# Patient Record
Sex: MALE | Race: WHITE | NOT HISPANIC OR LATINO | Employment: OTHER | URBAN - METROPOLITAN AREA
[De-identification: names, ages, dates, MRNs, and addresses within clinical notes are randomized per-mention and may not be internally consistent; named-entity substitution may affect disease eponyms.]

---

## 2018-07-28 ENCOUNTER — ANESTHESIA EVENT (OUTPATIENT)
Dept: GASTROENTEROLOGY | Facility: HOSPITAL | Age: 53
End: 2018-07-28
Payer: COMMERCIAL

## 2018-07-28 RX ORDER — SODIUM CHLORIDE 9 MG/ML
125 INJECTION, SOLUTION INTRAVENOUS CONTINUOUS
Status: CANCELLED | OUTPATIENT
Start: 2018-07-28

## 2018-07-30 RX ORDER — HYDROCODONE BITARTRATE AND ACETAMINOPHEN 5; 325 MG/1; MG/1
1 TABLET ORAL EVERY 6 HOURS PRN
COMMUNITY
End: 2018-10-25

## 2018-07-30 RX ORDER — TACROLIMUS 1 MG/1
1 CAPSULE ORAL EVERY 12 HOURS SCHEDULED
COMMUNITY

## 2018-07-30 RX ORDER — ACYCLOVIR 400 MG/1
400 TABLET ORAL 3 TIMES DAILY
COMMUNITY

## 2018-07-31 ENCOUNTER — HOSPITAL ENCOUNTER (OUTPATIENT)
Facility: HOSPITAL | Age: 53
Setting detail: OUTPATIENT SURGERY
Discharge: HOME | End: 2018-07-31
Attending: INTERNAL MEDICINE | Admitting: INTERNAL MEDICINE
Payer: COMMERCIAL

## 2018-07-31 ENCOUNTER — ANESTHESIA (OUTPATIENT)
Dept: GASTROENTEROLOGY | Facility: HOSPITAL | Age: 53
End: 2018-07-31
Payer: COMMERCIAL

## 2018-07-31 VITALS
BODY MASS INDEX: 25.77 KG/M2 | TEMPERATURE: 98.2 F | HEIGHT: 70 IN | OXYGEN SATURATION: 96 % | RESPIRATION RATE: 18 BRPM | DIASTOLIC BLOOD PRESSURE: 82 MMHG | HEART RATE: 60 BPM | SYSTOLIC BLOOD PRESSURE: 132 MMHG | WEIGHT: 180 LBS

## 2018-07-31 DIAGNOSIS — R19.7 DIARRHEA: ICD-10-CM

## 2018-07-31 DIAGNOSIS — E66.3 OVERWEIGHT: ICD-10-CM

## 2018-07-31 DIAGNOSIS — R10.13 EPIGASTRIC PAIN: ICD-10-CM

## 2018-07-31 PROCEDURE — 88305 TISSUE EXAM BY PATHOLOGIST: CPT | Performed by: PATHOLOGY

## 2018-07-31 PROCEDURE — 88342 IMHCHEM/IMCYTCHM 1ST ANTB: CPT | Performed by: PATHOLOGY

## 2018-07-31 RX ORDER — SODIUM CHLORIDE 9 MG/ML
125 INJECTION, SOLUTION INTRAVENOUS CONTINUOUS
Status: DISCONTINUED | OUTPATIENT
Start: 2018-07-31 | End: 2018-07-31 | Stop reason: HOSPADM

## 2018-07-31 RX ORDER — PROPOFOL 10 MG/ML
INJECTION, EMULSION INTRAVENOUS AS NEEDED
Status: DISCONTINUED | OUTPATIENT
Start: 2018-07-31 | End: 2018-07-31 | Stop reason: SURG

## 2018-07-31 RX ADMIN — LIDOCAINE HYDROCHLORIDE 20 MG: 20 INJECTION, SOLUTION INTRAVENOUS at 13:08

## 2018-07-31 RX ADMIN — PROPOFOL 20 MG: 10 INJECTION, EMULSION INTRAVENOUS at 13:11

## 2018-07-31 RX ADMIN — SODIUM CHLORIDE 125 ML/HR: 0.9 INJECTION, SOLUTION INTRAVENOUS at 12:45

## 2018-07-31 RX ADMIN — PROPOFOL 200 MG: 10 INJECTION, EMULSION INTRAVENOUS at 13:08

## 2018-07-31 NOTE — ANESTHESIA PREPROCEDURE EVALUATION
Review of Systems/Medical History  Patient summary reviewed  Chart reviewed      Cardiovascular  Negative cardio ROS    Pulmonary  Negative pulmonary ROS        GI/Hepatic  Negative GI/hepatic ROS          Negative  ROS        Endo/Other  Negative endo/other ROS      GYN  Negative gynecology ROS          Hematology      Comment: AML in remission--S/P stem cell and bone marrow transplant Musculoskeletal  Negative musculoskeletal ROS        Neurology  Negative neurology ROS      Psychology   Negative psychology ROS              Physical Exam    Airway    Mallampati score: II  TM Distance: >3 FB  Neck ROM: full     Dental   Comment: Multiple caps,     Cardiovascular  Comment: Negative ROS, Rhythm: regular, Rate: normal, Cardiovascular exam normal    Pulmonary  Pulmonary exam normal Breath sounds clear to auscultation,     Other Findings        Anesthesia Plan  ASA Score- 2     Anesthesia Type- IV sedation with anesthesia with ASA Monitors  Additional Monitors:   Airway Plan:         Plan Factors-Patient not instructed to abstain from smoking on day of procedure  Patient did not smoke on day of surgery  Induction- intravenous  Postoperative Plan-     Informed Consent- Anesthetic plan and risks discussed with patient and spouse

## 2018-07-31 NOTE — DISCHARGE INSTRUCTIONS
Please call [phone] with any problems  I have directed my office to call you with prescription for omeprazole 20 mg p o  b i d   If you have any problems with medication please stop and call us  Please follow up in the office as previously arranged  Esophagitis   WHAT YOU NEED TO KNOW:   What is esophagitis? Esophagitis is inflammation or irritation of the lining of the esophagus  What causes esophagitis? The most common cause is acid reflux  This means stomach acid backs up into your esophagus  The following can also cause esophagitis:  · An infection from bacteria, a virus, or a fungus    · Vomiting or a hiatal hernia    · Medicines such as aspirin or NSAIDs    · Large pills taken without enough water or right before you go to bed    · Cancer treatment, such as radiation    · A toxic object you swallowed, such as a button battery, that gets stuck in your esophagus    · Too much caffeine or acidic or spicy foods    · Cigarette smoking  What are the signs and symptoms of esophagitis? Signs and symptoms depend on the cause of your esophagitis  You may have any of the following:  · Pain in the middle of your chest that may spread to your back    · Burning or pain in your esophagus, abdominal pain, or indigestion    · Trouble swallowing, or pain when you swallow    · A feeling that something is stuck in your esophagus    · Sore throat, a cough, or hoarseness    · Gagging, drooling, or wheezing    · Mouth sores (white patches), or a bad taste in your mouth or bad breath    · Nausea or vomiting    · Feeding problems or failure to thrive (young children)  How is esophagitis diagnosed? Your healthcare provider will ask about your symptoms and when they started  Tell him if anything makes your symptoms worse or better  You may need any of the following:  · An endoscopy  is a procedure used to look at your esophagus and stomach   Your healthcare provider will use an endoscope (tube with a camera and light on the end)  He may also take a tissue sample during the procedure  The sample may show if your esophagus was damaged by what is causing your esophagitis  · A barium swallow  is done to show if your esophagus was damaged and how badly it was damaged  X-rays are taken after you swallow barium liquid  Barium liquid is used to help damage show up on the x-ray  How is esophagitis treated? The goal of treatment is to help the lining of your esophagus heal and to prevent serious complications  Treatment will depend on what is causing your esophagitis  Symptoms caused by a toxic object such as a button battery need immediate treatment  Less severe causes may not need treatment  You may need any of the following if symptoms continue or get worse:  · Medicines  may be given to fight infection or to control stomach acid  Your healthcare provider may make changes to your medicines, such as changing it to a liquid form  · An elimination diet  may help you find foods that are causing your symptoms  You will stop eating certain foods that can cause esophagitis  Your healthcare provider will tell you to start eating them again one at a time  Each time you do not have symptoms, you will start eating another food from the list  Any food that does cause symptoms may be causing your esophagitis  · Surgery  may be needed if other treatments do not work  Part of your stomach can be wrapped to cover the valve between your stomach and esophagus  This helps prevent acid from backing up into your esophagus  What can I do to manage or prevent esophagitis? · Do not smoke  Nicotine and other chemicals in cigarettes and cigars can cause blood vessel and lung damage  Ask your healthcare provider for information if you currently smoke and need help to quit  E-cigarettes or smokeless tobacco still contain nicotine  Talk to your healthcare provider before you use these products  · Do not drink alcohol    Alcohol can irritate your esophagus  Talk to your healthcare provider if you need help to stop drinking  · Limit or do not eat foods that can lead to esophagitis  Foods such as oranges and salsa can irritate your esophagus  Caffeine and chocolate can cause acid reflux  High-fat and fried foods make your stomach digest food more slowly  This increases the amount of stomach acid your esophagus is exposed to  Eat small meals, and drink water with your meals  Soft foods such as yogurt and applesauce may help soothe your throat  Do not eat for at least 3 hours before you go to bed  · Keep batteries and similar objects out of the reach of children  Babies often put items in their mouths to explore them  Button batteries are easy to swallow and can cause serious damage  Keep the battery covers of electronic devices such as remote controls taped closed  Store all batteries and toxic materials where children cannot get to them  Use childproof locks to keep children away from dangerous materials  · Drink more liquid when you take pills  Drink a full glass of water when you take your pills  Ask your healthcare provider if you can take your pills at least an hour before you go to bed  · Prevent acid reflux  Do not bend over unless it is necessary  Acid may back up into your esophagus when you bend over  If possible, keep the head of your bed elevated while you sleep  This will help keep acid from backing up  Manage stress  Stress can make your symptoms worse or cause stomach acid to back up  Call 911 for any of the following:   · You have chest pain that does not go away within a few minutes or gets worse  When should I seek immediate care? · You feel like you have food stuck in your throat and you cannot cough it out  When should I contact my healthcare provider? · You have new or worsening symptoms, even after treatment  · You have questions or concerns about your condition or care    CARE AGREEMENT:   You have the right to help plan your care  Learn about your health condition and how it may be treated  Discuss treatment options with your caregivers to decide what care you want to receive  You always have the right to refuse treatment  The above information is an  only  It is not intended as medical advice for individual conditions or treatments  Talk to your doctor, nurse or pharmacist before following any medical regimen to see if it is safe and effective for you  © 2017 [employer] Information is for End User's use only and may not be sold, redistributed or otherwise used for commercial purposes  All illustrations and images included in CareNotes® are the copyrighted property of A D A M [employer] Inc  or [employer]  Upper Endoscopy   WHAT YOU NEED TO KNOW:   An upper endoscopy is also called an upper gastrointestinal (GI) endoscopy, or an esophagogastroduodenoscopy (EGD)  You may feel bloated, gassy, or have some abdominal discomfort after your procedure  Your throat may be sore for 24 to 36 hours  You may burp or pass gas from air that is still inside your body  DISCHARGE INSTRUCTIONS:   Call 911 if:   · You have sudden chest pain or trouble breathing  Seek care immediately if:   · You feel dizzy or faint  · You have trouble swallowing  · You have severe throat pain  · Your bowel movements are very dark or black  · Your abdomen is hard and firm and you have severe pain  · You vomit blood  Contact your healthcare provider if:   · You feel full or bloated and cannot burp or pass gas  · You have not had a bowel movement for 3 days after your procedure  · You have neck pain  · You have a fever or chills  · You have nausea or are vomiting  · You have a rash or hives  · You have questions or concerns about your endoscopy  Relieve a sore throat:  Suck on throat lozenges or crushed ice  Gargle with a small amount of warm salt water   Mix 1 teaspoon of salt and 1 cup of warm water to make salt water  Relieve gas and discomfort from bloating:  Lie on your right side with a heating pad on your abdomen  Take short walks to help pass gas  Eat small meals until bloating is relieved  Rest after your procedure:  Do not drive or make important decisions until the day after your procedure  Return to your normal activity as directed  You can usually return to work the day after your procedure  Follow up with your healthcare provider as directed:  Write down your questions so you remember to ask them during your visits  © 2017 [facility] [facility] Information is for End User's use only and may not be sold, redistributed or otherwise used for commercial purposes  All illustrations and images included in CareNotes® are the copyrighted property of [employer]  or Arjun Ole[employer]  The above information is an  only  It is not intended as medical advice for individual conditions or treatments  Talk to your doctor, nurse or pharmacist before following any medical regimen to see if it is safe and effective for you

## 2018-08-27 ENCOUNTER — HOSPITAL ENCOUNTER (EMERGENCY)
Facility: HOSPITAL | Age: 53
Discharge: HOME | End: 2018-08-28
Attending: EMERGENCY MEDICINE
Payer: COMMERCIAL

## 2018-08-27 VITALS
WEIGHT: 175 LBS | HEIGHT: 70 IN | HEART RATE: 82 BPM | DIASTOLIC BLOOD PRESSURE: 83 MMHG | OXYGEN SATURATION: 97 % | RESPIRATION RATE: 18 BRPM | TEMPERATURE: 97.7 F | BODY MASS INDEX: 25.05 KG/M2 | SYSTOLIC BLOOD PRESSURE: 132 MMHG

## 2018-08-27 DIAGNOSIS — T63.441A BEE STING: Primary | ICD-10-CM

## 2018-08-28 LAB
ANION GAP SERPL CALCULATED.3IONS-SCNC: 7 MMOL/L (ref 4–13)
BASOPHILS # BLD AUTO: 0.1 THOUSANDS/ΜL (ref 0–0.1)
BASOPHILS NFR BLD AUTO: 1 % (ref 0–2)
BUN SERPL-MCNC: 30 MG/DL (ref 7–25)
CALCIUM SERPL-MCNC: 9.4 MG/DL (ref 8.6–10.5)
CHLORIDE SERPL-SCNC: 105 MMOL/L (ref 98–107)
CO2 SERPL-SCNC: 26 MMOL/L (ref 21–31)
CREAT SERPL-MCNC: 1.21 MG/DL (ref 0.7–1.3)
EOSINOPHIL # BLD AUTO: 0.4 THOUSAND/ΜL (ref 0–0.61)
EOSINOPHIL NFR BLD AUTO: 2 % (ref 0–5)
ERYTHROCYTE [DISTWIDTH] IN BLOOD BY AUTOMATED COUNT: 12.5 % (ref 11.5–14.5)
GFR SERPL CREATININE-BSD FRML MDRD: 69 ML/MIN/1.73SQ M
GLUCOSE SERPL-MCNC: 93 MG/DL (ref 65–99)
HCT VFR BLD AUTO: 39.3 % (ref 36.5–49.3)
HGB BLD-MCNC: 13.1 G/DL (ref 14–18)
LYMPHOCYTES # BLD AUTO: 3.2 THOUSANDS/ΜL (ref 0.6–4.47)
LYMPHOCYTES NFR BLD AUTO: 20 % (ref 21–51)
MCH RBC QN AUTO: 31.8 PG (ref 26–34)
MCHC RBC AUTO-ENTMCNC: 33.2 G/DL (ref 31–37)
MCV RBC AUTO: 96 FL (ref 81–99)
MONOCYTES # BLD AUTO: 1.7 THOUSAND/ΜL (ref 0.17–1.22)
MONOCYTES NFR BLD AUTO: 11 % (ref 2–12)
NEUTROPHILS # BLD AUTO: 10.9 THOUSANDS/ΜL (ref 1.4–6.5)
NEUTS SEG NFR BLD AUTO: 67 % (ref 42–75)
NRBC BLD AUTO-RTO: 0 /100 WBCS
PLATELET # BLD AUTO: 344 THOUSANDS/UL (ref 149–390)
PMV BLD AUTO: 8.5 FL (ref 8.6–11.7)
POTASSIUM SERPL-SCNC: 3.8 MMOL/L (ref 3.5–5.5)
RBC # BLD AUTO: 4.11 MILLION/UL (ref 4.3–5.9)
SODIUM SERPL-SCNC: 138 MMOL/L (ref 134–143)
WBC # BLD AUTO: 16.3 THOUSAND/UL (ref 4.8–10.8)

## 2018-08-28 PROCEDURE — 96365 THER/PROPH/DIAG IV INF INIT: CPT

## 2018-08-28 PROCEDURE — 80048 BASIC METABOLIC PNL TOTAL CA: CPT | Performed by: EMERGENCY MEDICINE

## 2018-08-28 PROCEDURE — 36415 COLL VENOUS BLD VENIPUNCTURE: CPT | Performed by: EMERGENCY MEDICINE

## 2018-08-28 PROCEDURE — 87040 BLOOD CULTURE FOR BACTERIA: CPT | Performed by: EMERGENCY MEDICINE

## 2018-08-28 PROCEDURE — 85025 COMPLETE CBC W/AUTO DIFF WBC: CPT | Performed by: EMERGENCY MEDICINE

## 2018-08-28 PROCEDURE — 99283 EMERGENCY DEPT VISIT LOW MDM: CPT

## 2018-08-28 RX ORDER — PREDNISONE 20 MG/1
40 TABLET ORAL ONCE
Status: COMPLETED | OUTPATIENT
Start: 2018-08-28 | End: 2018-08-28

## 2018-08-28 RX ORDER — HYDROXYZINE HYDROCHLORIDE 25 MG/1
50 TABLET, FILM COATED ORAL ONCE
Status: COMPLETED | OUTPATIENT
Start: 2018-08-28 | End: 2018-08-28

## 2018-08-28 RX ORDER — PREDNISONE 20 MG/1
40 TABLET ORAL DAILY
Qty: 15 TABLET | Refills: 0 | Status: SHIPPED | OUTPATIENT
Start: 2018-08-28 | End: 2018-09-02

## 2018-08-28 RX ORDER — CLINDAMYCIN HYDROCHLORIDE 150 MG/1
150 CAPSULE ORAL EVERY 6 HOURS
Qty: 12 CAPSULE | Refills: 0 | Status: SHIPPED | OUTPATIENT
Start: 2018-08-28 | End: 2018-08-31

## 2018-08-28 RX ORDER — HYDROXYZINE HYDROCHLORIDE 25 MG/1
25 TABLET, FILM COATED ORAL EVERY 6 HOURS
Qty: 12 TABLET | Refills: 0 | Status: SHIPPED | OUTPATIENT
Start: 2018-08-28 | End: 2018-10-25

## 2018-08-28 RX ORDER — CLINDAMYCIN PHOSPHATE 600 MG/50ML
600 INJECTION INTRAVENOUS ONCE
Status: COMPLETED | OUTPATIENT
Start: 2018-08-28 | End: 2018-08-28

## 2018-08-28 RX ADMIN — PREDNISONE 40 MG: 20 TABLET ORAL at 00:30

## 2018-08-28 RX ADMIN — HYDROXYZINE HYDROCHLORIDE 50 MG: 25 TABLET ORAL at 00:30

## 2018-08-28 RX ADMIN — CLINDAMYCIN IN 5 PERCENT DEXTROSE 600 MG: 12 INJECTION, SOLUTION INTRAVENOUS at 01:47

## 2018-08-28 NOTE — DISCHARGE INSTRUCTIONS
Insect Bite or Sting   WHAT YOU NEED TO KNOW:   Most insect bites and stings are not dangerous and go away without treatment  Your symptoms may be mild, or you may develop anaphylaxis  Anaphylaxis is a sudden, life-threatening reaction that needs immediate treatment  Common examples of insects that bite or sting are bees, ticks, mosquitoes, spiders, and ants  Insect bites or stings can lead to diseases such as malaria, West Nile virus, Lyme disease, or [place] Spotted Fever  DISCHARGE INSTRUCTIONS:   Call 911 for signs or symptoms of anaphylaxis,  such as trouble breathing, swelling in your mouth or throat, or wheezing  You may also have itching, a rash, hives, or feel like you are going to faint  Return to the emergency department if:   · You are stung on your tongue or in your throat  · A white area forms around the bite  · You are sweating badly or have body pain  · You think you were bitten or stung by a poisonous insect  Contact your healthcare provider if:   · You have a fever  · The area becomes red, warm, tender, and swollen beyond the area of the bite or sting  · You have questions or concerns about your condition or care  Medicines:   · Antihistamines  decrease itching and rash  · Epinephrine  is used to treat severe allergic reactions such as anaphylaxis  · Take your medicine as directed  Contact your healthcare provider if you think your medicine is not helping or if you have side effects  Tell him of her if you are allergic to any medicine  Keep a list of the medicines, vitamins, and herbs you take  Include the amounts, and when and why you take them  Bring the list or the pill bottles to follow-up visits  Carry your medicine list with you in case of an emergency  Steps to take for signs or symptoms of anaphylaxis:   · Immediately  give 1 shot of epinephrine only into the outer thigh muscle  · Leave the shot in place  as directed   Your healthcare provider may recommend you leave it in place for up to 10 seconds before you remove it  This helps make sure all of the epinephrine is delivered  · Call 911 and go to the emergency department,  even if the shot improved symptoms  Do not drive yourself  Bring the used epinephrine shot with you  Safety precautions to take if you are at risk for anaphylaxis:   · Keep 2 shots of epinephrine with you at all times  You may need a second shot, because epinephrine only works for about 20 minutes and symptoms may return  Your healthcare provider can show you and family members how to give the shot  Check the expiration date every month and replace it before it expires  · Create an action plan  Your healthcare provider can help you create a written plan that explains the allergy and an emergency plan to treat a reaction  The plan explains when to give a second epinephrine shot if symptoms return or do not improve after the first  Give copies of the action plan and emergency instructions to family members, work and school staff, and  providers  Show them how to give a shot of epinephrine  · Carry medical alert identification  Wear medical alert jewelry or carry a card that says you have an insect allergy  Ask your healthcare provider where to get these items  If an insect bites or stings you:   · Remove the stinger  Scrape the stinger out with your fingernail, edge of a credit card, or a knife blade  Do not squeeze the wound  Gently wash the area with soap and water  · Remove the tick  Ticks must be removed as soon as possible so you do not get diseases passed through tick bites  Ask your healthcare provider for more information on tick bites and how to remove ticks  Care for a bite or sting wound:   · Elevate the affected area  Prop the wound above the level of your heart, if possible  Elevate the area for 10 to 20 minutes each hour or as directed by your healthcare provider  · Use compresses    Soak a clean washcloth in cold water, wring it out, and put it on the bite or sting  Use the compress for 10 to 20 minutes each hour or as directed by your healthcare provider  After 24 to 48 hours, change to warm compresses  · Apply a paste  Add water to baking soda to make a thick paste  Put the paste on the area for 5 minutes  Rinse gently to remove the paste  Prevent another insect bite or sting:   · Do not wear bright-colored or flower-print clothing when you plan to spend time outdoors  Do not use hairspray, perfumes, or aftershave  · Do not leave food out  · Empty any standing water and wash container with soap and water every 2 days  · Put screens on all open windows and doors  · Put insect repellent that contains DEET on skin that is showing when you go outside  Put insect repellent at the top of your boots, bottom of pant legs, and sleeve cuffs  Wear long sleeves, pants, and shoes  · Use citronella candles outdoors to help keep mosquitoes away  Put a tick and flea collar on pets  Follow up with your healthcare provider as directed:  Write down your questions so you remember to ask them during your visits  © 2017 [facility] Information is for End User's use only and may not be sold, redistributed or otherwise used for commercial purposes  All illustrations and images included in CareNotes® are the copyrighted property of [employer]  or Arjun Oleary  The above information is an  only  It is not intended as medical advice for individual conditions or treatments  Talk to your doctor, nurse or pharmacist before following any medical regimen to see if it is safe and effective for you

## 2018-08-28 NOTE — ED PROVIDER NOTES
History  Chief Complaint   Patient presents with    Bee Sting     Pt stung yesterday, now skin tight, red, warm  Pt used ice, calomine, advil, benadryl yesterday  [age] YEAR OLD MALE WITH HISTORY IMMUNOCOMPROMISE SECONDARY TO CANCER TREATMENT PRESENTS NOW WITH A BEE STING TO THE RIGHT ANKLE OCCURRED 24 HOURS AGO  ON PATIENT'S EXPERIENCE PAIN AND ITCHING ALONG WITH EXPANDING REDNESS IN THE AREA OF THE BITE  DENIES ANY SHORTNESS OF BREATH OR DIFFICULTY SWALLOWING  EXPRESSES CONCERNS BECAUSE OF HIS IMMUNOSUPPRESSION AS IT RELATES TO A POSSIBLE INFECTION IN THE AREA  Prior to Admission Medications   Prescriptions Last Dose Informant Patient Reported? Taking? Cholecalciferol (VITAMIN D PO)   Yes Yes   Sig: Take by mouth daily   HYDROcodone-acetaminophen (NORCO) 5-325 mg per tablet   Yes Yes   Sig: Take 1 tablet by mouth every 6 (six) hours as needed for pain   Ibuprofen (ADVIL PO)   Yes Yes   Sig: Take by mouth as needed   MAGNESIUM PO   Yes Yes   Sig: Take by mouth daily   acyclovir (ZOVIRAX) 400 MG tablet   Yes Yes   Sig: Take 400 mg by mouth 3 (three) times a day   tacrolimus (PROGRAF) 1 mg capsule   Yes Yes   Sig: Take 1 mg by mouth every 12 (twelve) hours      Facility-Administered Medications: None       Past Medical History:   Diagnosis Date    Cancer ([facility] )     AML- [facility] marow [facility] Clostridium difficile colitis 2009    [facility]       Past Surgical History:   Procedure Laterality Date    COLONOSCOPY  2015    HERNIA REPAIR Bilateral 2003    inguinal    VT ESOPHAGOGASTRODUODENOSCOPY TRANSORAL DIAGNOSTIC N/A 7/31/2018    Procedure: ESOPHAGOGASTRODUODENOSCOPY (EGD) with bx;  Surgeon: Monique Reyes MD;  Location: [facility]; Service: Gastroenterology       History reviewed  No pertinent family history  I have reviewed and agree with the history as documented      Social History   Substance Use Topics    Smoking status: Never Smoker    Smokeless tobacco: Never Used    Alcohol use Yes      Comment: 1-2 drinks per week        Review of Systems   Constitutional: Negative for chills and fever  HENT: Negative for ear pain, rhinorrhea and sore throat  Eyes: Negative for pain, redness and visual disturbance  Respiratory: Negative for cough and shortness of breath  Cardiovascular: Negative for chest pain and leg swelling  Gastrointestinal: Negative for abdominal pain, diarrhea, nausea and vomiting  Genitourinary: Negative for dysuria, flank pain, frequency and urgency  Musculoskeletal: Negative for back pain, myalgias and neck pain  Skin: Negative for rash  Neurological: Negative for dizziness, weakness, light-headedness and headaches  Hematological: Negative  Psychiatric/Behavioral: Negative for agitation, confusion and suicidal ideas  The patient is not nervous/anxious  All other systems reviewed and are negative  Physical Exam  Physical Exam   Constitutional: He is oriented to person, place, and time  He appears well-developed and well-nourished  HENT:   Nose: Nose normal    Mouth/Throat: Oropharynx is clear and moist  No oropharyngeal exudate  Eyes: Conjunctivae and EOM are normal  Pupils are equal, round, and reactive to light  No scleral icterus  Neck: Normal range of motion  Neck supple  No JVD present  No tracheal deviation present  Cardiovascular: Normal rate, regular rhythm and normal heart sounds  No murmur heard  Pulmonary/Chest: Effort normal and breath sounds normal  No respiratory distress  He has no wheezes  He has no rales  Abdominal: Soft  Bowel sounds are normal  There is no tenderness  There is no guarding  Musculoskeletal: Normal range of motion  He exhibits no edema or tenderness  Neurological: He is alert and oriented to person, place, and time  No cranial nerve deficit or sensory deficit  He exhibits normal muscle tone  5/5 motor, nl sens   Skin: Skin is warm and dry  There is erythema  RIGHT LATERAL ANKLE IS SOMEWHAT ERYTHEMATOUS WITH A SOMEWHAT BRAWNY EDEMATOUS APPEARANCE THERE IS SOME EXTENSION OF THE ERYTHEMA PROXIMAL WERE TO THE DISTAL 3RD OF THE LOWER EXTREMITY ON THE RIGHT  THERE IS A CENTRAL AREA THAT IS MILDLY CRUSTED AND ELEVATED  THERE IS NO OOZE NOTED THERE IS NO SIGNIFICANT WARMTH MINIMAL TENDERNESS ASSOCIATED WITH THE AREA  FULL RANGE OF MOTION THE EXTREMITY  Psychiatric: He has a normal mood and affect  His behavior is normal    Nursing note and vitals reviewed  Vital Signs  ED Triage Vitals [08/27/18 2351]   Temperature Pulse Respirations Blood Pressure SpO2   97 7 °F (36 5 °C) 82 18 132/83 97 %      Temp Source Heart Rate Source Patient Position - Orthostatic VS BP Location FiO2 (%)   Temporal Monitor Lying Left arm --      Pain Score       7           Vitals:    08/27/18 2351   BP: 132/83   Pulse: 82   Patient Position - Orthostatic VS: Lying       Visual Acuity      ED Medications  Medications - No data to display    Diagnostic Studies  Results Reviewed     None                 No orders to display              Procedures  Procedures       Phone Contacts  ED Phone Contact    ED Course                               MDM  Number of Diagnoses or Management Options  Bee sting:   Diagnosis management comments: UPON DISCHARGE, THE PATIENT'S WIFE CONFRONTED THIS EDP QUESTIONING WHY AN IV DOSE OF ANTIBIOTICS AND LAB STUDIES WERE NOT COMPLETED  THE PATIENT'S WIFE STATES THAT THE AREA IS CLEARLY CELLULITIC AND THE PATIENT IS IMMUNOCOMPROMISED  THE EXAM FINDINGS DO NOT SUGGEST A CELLULITIC PROCESS, THAT IS THE AREA IS ERYTHEMATOUS WITHOUT SIGNIFICANT WARMTH, THERE IS A MINOR SEROUS OOZE, THERE IS NO SIGNIFICANT EDEMA, THERE IS MINIMAL TENDERNESS  NV/SENSORY IS INTACT   YET, THE PATIENT'S WIFE MAINTAINS THAT IT IS CLEARLY CELLULITIC  THE PATIENT IS OFFERED IV ANTIIBIOTICS, CBC, BMP, AND BLOOD CULTURES    HOWEVER, THE WIFE NOW RAISES THE QUESTION AS TO WHY SHE HAD TO INSIST ON ANTIBIOTICS, WHEN, IN FACT, THE PATIENT IS OFFERED ANTIBIOTICS AND A PRESCRIPTION FOR SAME IS WRITTEN  THE PATIENT'S WIFE NOW RAISES THE QUESTION OF WHAT IS BEING OFFERED TO AND FOR THE PATIENT SUGGESTING THAT "YOUR THE DOCTOR"  FOLLOWING THE HERETOFORE DISCUSSION, THE PATIENT'S WIFE NOW QUESTIONS THE NATURE OF THE STUDIES  THE PATIENT'S WIFE IS RELUCTANT TO STAY FOR BLOOD RESULTS, STATING THAT BLOOD CULTURES WOULD "GIVE US THE ANSWER"  THE PATIENT AND WIDFE WERE ADVISED THAT STAT CBC WOULD GIVE US INSIGHT INTO A POTENTIALLY INFECTIOUS PROCESS  THEY AGREED TO HAVE THE APPROPRIATE STUDIES DONE  AT1:50AM, THE CBC RETURNED WITH WBCs OF 16 3 WITHOUT A SHIFT, NEUTROPHILS 67  THE PATIENT REMAINS HEMODYNAMICALLY AND CLINICALLY STABLE WITHOUT EVIDENCE OF A SEPTIC PROCESS    CritCare Time    Disposition  Final diagnoses:   None     ED Disposition     None      Follow-up Information    None         Patient's Medications   Discharge Prescriptions    No medications on file     No discharge procedures on file      ED Provider  Electronically Signed by           Boo Abreu MD  08/28/18 5959       Boo Abreu MD  08/28/18 0110       Boo Abreu MD  08/28/18 8998

## 2018-09-02 LAB
BACTERIA BLD CULT: NORMAL
BACTERIA BLD CULT: NORMAL

## 2018-10-25 ENCOUNTER — APPOINTMENT (EMERGENCY)
Dept: CT IMAGING | Facility: HOSPITAL | Age: 53
End: 2018-10-25
Payer: COMMERCIAL

## 2018-10-25 ENCOUNTER — HOSPITAL ENCOUNTER (EMERGENCY)
Facility: HOSPITAL | Age: 53
Discharge: HOME | End: 2018-10-25
Attending: EMERGENCY MEDICINE | Admitting: EMERGENCY MEDICINE
Payer: COMMERCIAL

## 2018-10-25 VITALS
TEMPERATURE: 98.5 F | OXYGEN SATURATION: 98 % | RESPIRATION RATE: 16 BRPM | BODY MASS INDEX: 25.05 KG/M2 | DIASTOLIC BLOOD PRESSURE: 74 MMHG | WEIGHT: 175 LBS | HEART RATE: 88 BPM | HEIGHT: 70 IN | SYSTOLIC BLOOD PRESSURE: 145 MMHG

## 2018-10-25 DIAGNOSIS — R03.0 ELEVATED BLOOD PRESSURE READING: ICD-10-CM

## 2018-10-25 DIAGNOSIS — N20.0 RENAL CALCULUS, BILATERAL: ICD-10-CM

## 2018-10-25 DIAGNOSIS — K76.89 LIVER CYST: ICD-10-CM

## 2018-10-25 DIAGNOSIS — R10.9 ABDOMINAL PAIN: Primary | ICD-10-CM

## 2018-10-25 DIAGNOSIS — I73.9 SMALL VESSEL DISEASE (HCC): ICD-10-CM

## 2018-10-25 DIAGNOSIS — R91.1 NODULE OF RIGHT LUNG: ICD-10-CM

## 2018-10-25 DIAGNOSIS — K57.90 DIVERTICULOSIS: ICD-10-CM

## 2018-10-25 LAB
ALBUMIN SERPL BCP-MCNC: 4.7 G/DL (ref 3.5–5.7)
ALP SERPL-CCNC: 60 U/L (ref 40–150)
ALT SERPL W P-5'-P-CCNC: 18 U/L (ref 7–52)
ANION GAP SERPL CALCULATED.3IONS-SCNC: 9 MMOL/L (ref 4–13)
APTT PPP: 47 SECONDS (ref 24–36)
AST SERPL W P-5'-P-CCNC: 21 U/L (ref 13–39)
BACTERIA UR QL AUTO: ABNORMAL /HPF
BASOPHILS # BLD AUTO: 0.1 THOUSANDS/ΜL (ref 0–0.1)
BASOPHILS NFR BLD AUTO: 1 % (ref 0–2)
BILIRUB SERPL-MCNC: 0.5 MG/DL (ref 0.2–1)
BILIRUB UR QL STRIP: NEGATIVE
BUN SERPL-MCNC: 34 MG/DL (ref 7–25)
CALCIUM SERPL-MCNC: 10.2 MG/DL (ref 8.6–10.5)
CHLORIDE SERPL-SCNC: 102 MMOL/L (ref 98–107)
CLARITY UR: CLEAR
CO2 SERPL-SCNC: 25 MMOL/L (ref 21–31)
COLOR UR: YELLOW
CREAT SERPL-MCNC: 1.17 MG/DL (ref 0.7–1.3)
EOSINOPHIL # BLD AUTO: 0 THOUSAND/ΜL (ref 0–0.61)
EOSINOPHIL NFR BLD AUTO: 0 % (ref 0–5)
ERYTHROCYTE [DISTWIDTH] IN BLOOD BY AUTOMATED COUNT: 12.3 % (ref 11.5–14.5)
GFR SERPL CREATININE-BSD FRML MDRD: 71 ML/MIN/1.73SQ M
GLUCOSE SERPL-MCNC: 132 MG/DL (ref 65–99)
GLUCOSE UR STRIP-MCNC: NEGATIVE MG/DL
HCT VFR BLD AUTO: 42.5 % (ref 36.5–49.3)
HGB BLD-MCNC: 14.2 G/DL (ref 14–18)
HGB UR QL STRIP.AUTO: ABNORMAL
INR PPP: 1.04 (ref 0.9–1.5)
KETONES UR STRIP-MCNC: ABNORMAL MG/DL
LACTATE SERPL-SCNC: 0.8 MMOL/L (ref 0.5–2)
LEUKOCYTE ESTERASE UR QL STRIP: NEGATIVE
LYMPHOCYTES # BLD AUTO: 2 THOUSANDS/ΜL (ref 0.6–4.47)
LYMPHOCYTES NFR BLD AUTO: 15 % (ref 21–51)
MCH RBC QN AUTO: 31.9 PG (ref 26–34)
MCHC RBC AUTO-ENTMCNC: 33.4 G/DL (ref 31–37)
MCV RBC AUTO: 95 FL (ref 81–99)
MONOCYTES # BLD AUTO: 0.8 THOUSAND/ΜL (ref 0.17–1.22)
MONOCYTES NFR BLD AUTO: 6 % (ref 2–12)
NEUTROPHILS # BLD AUTO: 10.6 THOUSANDS/ΜL (ref 1.4–6.5)
NEUTS SEG NFR BLD AUTO: 79 % (ref 42–75)
NITRITE UR QL STRIP: NEGATIVE
NON-SQ EPI CELLS URNS QL MICRO: ABNORMAL /HPF
NRBC BLD AUTO-RTO: 0 /100 WBCS
PH UR STRIP.AUTO: 5.5 [PH] (ref 5–8)
PLATELET # BLD AUTO: 358 THOUSANDS/UL (ref 149–390)
PMV BLD AUTO: 8.4 FL (ref 8.6–11.7)
POTASSIUM SERPL-SCNC: 4.2 MMOL/L (ref 3.5–5.5)
PROT SERPL-MCNC: 8.1 G/DL (ref 6.4–8.9)
PROT UR STRIP-MCNC: NEGATIVE MG/DL
PROTHROMBIN TIME: 12.1 SECONDS (ref 10.1–12.9)
RBC # BLD AUTO: 4.46 MILLION/UL (ref 4.3–5.9)
RBC #/AREA URNS AUTO: ABNORMAL /HPF
SODIUM SERPL-SCNC: 136 MMOL/L (ref 134–143)
SP GR UR STRIP.AUTO: 1.01 (ref 1–1.03)
UROBILINOGEN UR QL STRIP.AUTO: 0.2 E.U./DL
WBC # BLD AUTO: 13.4 THOUSAND/UL (ref 4.8–10.8)
WBC #/AREA URNS AUTO: ABNORMAL /HPF

## 2018-10-25 PROCEDURE — 85730 THROMBOPLASTIN TIME PARTIAL: CPT | Performed by: PHYSICIAN ASSISTANT

## 2018-10-25 PROCEDURE — 74177 CT ABD & PELVIS W/CONTRAST: CPT

## 2018-10-25 PROCEDURE — 36415 COLL VENOUS BLD VENIPUNCTURE: CPT | Performed by: PHYSICIAN ASSISTANT

## 2018-10-25 PROCEDURE — 99284 EMERGENCY DEPT VISIT MOD MDM: CPT

## 2018-10-25 PROCEDURE — 81003 URINALYSIS AUTO W/O SCOPE: CPT | Performed by: PHYSICIAN ASSISTANT

## 2018-10-25 PROCEDURE — 80053 COMPREHEN METABOLIC PANEL: CPT | Performed by: PHYSICIAN ASSISTANT

## 2018-10-25 PROCEDURE — 85610 PROTHROMBIN TIME: CPT | Performed by: PHYSICIAN ASSISTANT

## 2018-10-25 PROCEDURE — 96361 HYDRATE IV INFUSION ADD-ON: CPT

## 2018-10-25 PROCEDURE — 83605 ASSAY OF LACTIC ACID: CPT | Performed by: PHYSICIAN ASSISTANT

## 2018-10-25 PROCEDURE — 85025 COMPLETE CBC W/AUTO DIFF WBC: CPT | Performed by: PHYSICIAN ASSISTANT

## 2018-10-25 PROCEDURE — 70450 CT HEAD/BRAIN W/O DYE: CPT

## 2018-10-25 PROCEDURE — 81001 URINALYSIS AUTO W/SCOPE: CPT | Performed by: PHYSICIAN ASSISTANT

## 2018-10-25 PROCEDURE — 96374 THER/PROPH/DIAG INJ IV PUSH: CPT

## 2018-10-25 RX ORDER — CIPROFLOXACIN 500 MG/1
500 TABLET, FILM COATED ORAL EVERY 12 HOURS SCHEDULED
Qty: 14 TABLET | Refills: 0 | Status: SHIPPED | OUTPATIENT
Start: 2018-10-25 | End: 2018-11-01

## 2018-10-25 RX ORDER — CIPROFLOXACIN 500 MG/1
500 TABLET, FILM COATED ORAL ONCE
Status: COMPLETED | OUTPATIENT
Start: 2018-10-25 | End: 2018-10-25

## 2018-10-25 RX ORDER — ONDANSETRON 2 MG/ML
4 INJECTION INTRAMUSCULAR; INTRAVENOUS ONCE
Status: COMPLETED | OUTPATIENT
Start: 2018-10-25 | End: 2018-10-25

## 2018-10-25 RX ADMIN — IOHEXOL 80 ML: 350 INJECTION, SOLUTION INTRAVENOUS at 17:24

## 2018-10-25 RX ADMIN — SODIUM CHLORIDE 1000 ML: 0.9 INJECTION, SOLUTION INTRAVENOUS at 16:16

## 2018-10-25 RX ADMIN — CIPROFLOXACIN HYDROCHLORIDE 500 MG: 500 TABLET, FILM COATED ORAL at 18:38

## 2018-10-25 RX ADMIN — ONDANSETRON 4 MG: 2 INJECTION INTRAMUSCULAR; INTRAVENOUS at 16:16

## 2018-10-25 NOTE — DISCHARGE INSTRUCTIONS
Abdominal Pain, Ambulatory Care   GENERAL INFORMATION:   Abdominal pain  can be dull, achy, or sharp  You may have pain in one area of your abdomen, or in your entire abdomen  Your pain may be caused by constipation, food sensitivity or poisoning, infection, or a blockage  Abdominal pain can also be caused by a hernia, appendicitis, or an ulcer  The cause of your abdominal pain may be unknown  Seek immediate care for the following symptoms:   · New chest pain or shortness of breath    · Pulsing pain in your upper abdomen or lower back that suddenly becomes constant    · Pain in the right lower abdominal area that worsens with movement    · Fever over 100 4°F (38°C) or shaking chills    · Vomiting and you cannot keep food or fluids down    · Pain does not improve or gets worse over the next 8 to 12 hours    · Blood in your vomit or bowel movements, or they look black and tarry    · Skin or the whites of your eyes turn yellow    · Large amount of vaginal bleeding that is not your monthly period  Treatment for abdominal pain  may include medicine to calm your stomach, prevent vomiting, or decrease pain  Follow up with your healthcare provider as directed:  Write down your questions so you remember to ask them during your visits  CARE AGREEMENT:   You have the right to help plan your care  Learn about your health condition and how it may be treated  Discuss treatment options with your caregivers to decide what care you want to receive  You always have the right to refuse treatment  The above information is an  only  It is not intended as medical advice for individual conditions or treatments  Talk to your doctor, nurse or pharmacist before following any medical regimen to see if it is safe and effective for you  © 2014 [address] is for End User's use only and may not be sold, redistributed or otherwise used for commercial purposes   All illustrations and images included in [facility] are the copyrighted property of A D A M , Inc  or Arjun Oleary  Kidney Stones   WHAT YOU NEED TO KNOW:   Kidney stones form in the urinary system when the water and waste in your urine are out of balance  When this happens, certain types of waste crystals separate from the urine  The crystals build up and form kidney stones  You may have 1 or more kidney stones  DISCHARGE INSTRUCTIONS:   Return to the emergency department if:   · You have vomiting that is not relieved by medicine  Contact your healthcare provider if:   · You have a fever  · You have trouble passing urine  · You see blood in your urine  · You have severe pain  · You have any questions or concerns about your condition or care  Medicines:   · NSAIDs , such as ibuprofen, help decrease swelling, pain, and fever  This medicine is available with or without a doctor's order  NSAIDs can cause stomach bleeding or kidney problems in certain people  If you take blood thinner medicine, always ask your healthcare provider if NSAIDs are safe for you  Always read the medicine label and follow directions  · Prescription medicine  may be given  Ask how to take this medicine safely  · Medicines  to balance your electrolytes may be needed  · Take your medicine as directed  Contact your healthcare provider if you think your medicine is not helping or if you have side effects  Tell him or her if you are allergic to any medicine  Keep a list of the medicines, vitamins, and herbs you take  Include the amounts, and when and why you take them  Bring the list or the pill bottles to follow-up visits  Carry your medicine list with you in case of an emergency  Follow up with your healthcare provider as directed: You may need to return for more tests  Write down your questions so you remember to ask them during your visits  Self-care:   · Drink plenty of liquids    Your healthcare provider may tell you to drink at least 8 to 12 (eight-ounce) cups of liquids each day  This helps flush out the kidney stones when you urinate  Water is the best liquid to drink  · Strain your urine every time you go to the bathroom  Urinate through a strainer or a piece of thin cloth to catch the stones  Take the stones to your healthcare provider so they can be sent to the lab for tests  This will help your healthcare providers plan the best treatment for you  · Eat a variety of healthy foods  Healthy foods include fruits, vegetables, whole-grain breads, low-fat dairy products, beans, and fish  You may need to limit how much sodium (salt) or protein you eat  Ask for information about the best foods for you  · Stay active  Your stones may pass more easily by if you stay active  Ask about the best activities for you  After you pass your kidney stones:  Once you have passed your kidney stones, your healthcare provider may  order a 24-hour urine test  Results from a 24-hour urine test will help your healthcare provider plan ways to prevent more stones from forming  If you are told to do a 24-hour test, your healthcare provider will give you more instructions  © 2017 [address] [facility] Information is for End User's use only and may not be sold, redistributed or otherwise used for commercial purposes  All illustrations and images included in CareNotes® are the copyrighted property of [employer]  or Arjun Oleary  The above information is an  only  It is not intended as medical advice for individual conditions or treatments  Talk to your doctor, nurse or pharmacist before following any medical regimen to see if it is safe and effective for you

## 2018-10-25 NOTE — ED PROVIDER NOTES
History  Chief Complaint   Patient presents with    Abdominal Pain     began yesterday    Vomiting    Abscess     buttocks     Patient presents emergency room with complaint of headache light sensitivity and vomiting  He states this began yesterday  Complaints bilateral light sensitivity denies injury or foreign bodies both eyes  He states he has had a headache from his right nostril has been draining for 2 days  He states them yesterday he developed vomiting 4 times to the middle of the night and 1 time prior to arrival today  He is unable to keep solids and liquids down  He states he took 2 ibuprofen earlier which brought is headache down to a 4/10 from a 7/10  He then this morning developed some lower abdominal pain without complaints of diarrhea denies urinary frequency or urgency  Denies chest pain shortness of breath  Denies ear pain ringing in the ears sore throat fevers or chills at home  He denies any syncopal events complaints of confusion slurred speech  Wife is at bedside and agreed with patient's statements  Patient is concerned about having a history of lymphoma and is on anti-rejection drugs  History provided by:  Patient and spouse   used: No        Allergies   Allergen Reactions    Cephalosporins Rash    Sulfa Antibiotics Rash         Prior to Admission Medications   Prescriptions Last Dose Informant Patient Reported? Taking?    Cholecalciferol (VITAMIN D PO)   Yes No   Sig: Take by mouth daily   MAGNESIUM PO   Yes No   Sig: Take by mouth daily   acyclovir (ZOVIRAX) 400 MG tablet   Yes No   Sig: Take 400 mg by mouth 3 (three) times a day   tacrolimus (PROGRAF) 1 mg capsule   Yes No   Sig: Take 1 mg by mouth every 12 (twelve) hours      Facility-Administered Medications: None       Past Medical History:   Diagnosis Date    Cancer ([facility] [address] )     AML- bone marow transplant 2010 at [facility] Clostridium difficile colitis 2009    H/O bone marrow transplant [facility] 2010       Past Surgical History:   Procedure Laterality Date    COLONOSCOPY  2015    HERNIA REPAIR Bilateral 2003    inguinal    [facility] ESOPHAGOGASTRODUODENOSCOPY TRANSORAL DIAGNOSTIC N/A 7/31/2018    Procedure: ESOPHAGOGASTRODUODENOSCOPY (EGD) with bx;  Surgeon: Serina Mathur MD;  Location: [facility]; Service: Gastroenterology       History reviewed  No pertinent family history  I have reviewed and agree with the history as documented  Social History   Substance Use Topics    Smoking status: Never Smoker    Smokeless tobacco: Never Used    Alcohol use Yes      Comment: 1-2 drinks per week        Review of Systems   Constitutional: Positive for fatigue  Negative for chills, diaphoresis and fever  HENT: Positive for congestion, rhinorrhea (right side) and sinus pressure  Negative for ear pain, facial swelling, hearing loss, nosebleeds, sneezing, sore throat, tinnitus and trouble swallowing  Eyes: Positive for photophobia and pain  Negative for discharge and itching  Respiratory: Negative for cough, shortness of breath, wheezing and stridor  Cardiovascular: Negative for chest pain, palpitations and leg swelling  Gastrointestinal: Positive for abdominal pain (Bilateral lower abdomen), nausea and vomiting  Negative for abdominal distention, blood in stool, constipation and diarrhea  Genitourinary: Negative for difficulty urinating, dysuria, flank pain, frequency, hematuria and urgency  Musculoskeletal: Negative for gait problem, myalgias and neck pain  Skin: Negative for rash  Neurological: Positive for headaches  Negative for dizziness, syncope, facial asymmetry, speech difficulty, weakness, light-headedness and numbness  All other systems reviewed and are negative  Physical Exam  Physical Exam   Constitutional: He is oriented to person, place, and time  He appears well-developed and well-nourished  HENT:   Head: Normocephalic and atraumatic     Right Ear: External ear normal    Left Ear: External ear normal    Nose: Mucosal edema and rhinorrhea present  Mouth/Throat: Oropharyngeal exudate present  Eyes: Pupils are equal, round, and reactive to light  Conjunctivae and EOM are normal    Neck: Normal range of motion  Neck supple  No tracheal deviation present  Cardiovascular: Normal rate, regular rhythm, normal heart sounds and intact distal pulses  No murmur heard  Pulmonary/Chest: Effort normal and breath sounds normal  No respiratory distress  He has no wheezes  He has no rales  Abdominal: Soft  Bowel sounds are normal  He exhibits no distension and no mass  There is tenderness (b/l lower abdominal)  No hernia  Musculoskeletal: Normal range of motion  He exhibits no edema or tenderness  Neurological: He is alert and oriented to person, place, and time  No cranial nerve deficit or sensory deficit  He exhibits normal muscle tone  Coordination normal    Skin: Skin is warm and dry  No rash noted  No erythema  Nursing note and vitals reviewed        Vital Signs  ED Triage Vitals [10/25/18 1524]   Temperature Pulse Respirations Blood Pressure SpO2   98 °F (36 7 °C) 82 16 138/95 97 %      Temp Source Heart Rate Source Patient Position - Orthostatic VS BP Location FiO2 (%)   Temporal Monitor Sitting Left arm --      Pain Score       7           Vitals:    10/25/18 1524   BP: 138/95   Pulse: 82   Patient Position - Orthostatic VS: Sitting       Visual Acuity      ED Medications  Medications   sodium chloride 0 9 % bolus 1,000 mL (0 mL Intravenous Stopped 10/25/18 1733)   ondansetron (ZOFRAN) injection 4 mg (4 mg Intravenous Given 10/25/18 1616)   iohexol (OMNIPAQUE) 350 MG/ML injection (MULTI-DOSE) 80 mL (80 mL Intravenous Given 10/25/18 1724)   ciprofloxacin (CIPRO) tablet 500 mg (500 mg Oral Given 10/25/18 1838)       Diagnostic Studies  Results Reviewed     Procedure Component Value Units Date/Time    Urine Microscopic [[id number]]  (Abnormal) Collected:  10/25/18 1738 Lab Status:  Final result Specimen:  Urine from Urine, Clean Catch Updated:  10/25/18 1804     RBC, UA 2-4 (A) /hpf      WBC, UA None Seen /hpf      Epithelial Cells Occasional /hpf      Bacteria, UA Occasional /hpf     UA w Reflex to Microscopic w Reflex to Culture [[id number]]  (Abnormal) Collected:  10/25/18 1738    Lab Status:  Final result Specimen:  Urine from Urine, Clean Catch Updated:  10/25/18 1745     Color, UA Yellow     Clarity, UA Clear     Specific Gravity, UA 1 010     pH, UA 5 5     Leukocytes, UA Negative     Nitrite, UA Negative     Protein, UA Negative mg/dl      Glucose, UA Negative mg/dl      Ketones, UA 15 (1+) (A) mg/dl      Urobilinogen, UA 0 2 E U /dl      Bilirubin, UA Negative     Blood, UA 1+ (A)    Lactic acid, plasma [[id number]]  (Normal) Collected:  10/25/18 1606    Lab Status:  Final result Specimen:  Blood from Arm, Left Updated:  10/25/18 1641     LACTIC ACID 0 8 mmol/L     Narrative:         Result may be elevated if tourniquet was used during collection  Comprehensive metabolic panel [[id number]]  (Abnormal) Collected:  10/25/18 1606    Lab Status:  Final result Specimen:  Blood from Arm, Left Updated:  10/25/18 1641     Sodium 136 mmol/L      Potassium 4 2 mmol/L      Chloride 102 mmol/L      CO2 25 mmol/L      ANION GAP 9 mmol/L      BUN 34 (H) mg/dL      Creatinine 1 17 mg/dL      Glucose 132 (H) mg/dL      Calcium 10 2 mg/dL      AST 21 U/L      ALT 18 U/L      Alkaline Phosphatase 60 U/L      Total Protein 8 1 g/dL      Albumin 4 7 g/dL      Total Bilirubin 0 50 mg/dL      eGFR 71 ml/min/1 73sq m     Narrative:         National Kidney Disease Education Program recommendations are as follows:  GFR calculation is accurate only with a steady state creatinine  Chronic Kidney disease less than 60 ml/min/1 73 sq  meters  Kidney failure less than 15 ml/min/1 73 sq  meters      Kashif Sandoval [[id number]]  (Normal) Collected:  10/25/18 1606    Lab Status:  Final result Specimen:  Blood from Arm, Left Updated:  10/25/18 1633     Protime 12 1 seconds      INR 1 04    APTT [[id number]]  (Abnormal) Collected:  10/25/18 1606    Lab Status:  Final result Specimen:  Blood from Arm, Left Updated:  10/25/18 1633     PTT 47 (H) seconds     CBC and differential [[id number]]  (Abnormal) Collected:  10/25/18 1606    Lab Status:  Final result Specimen:  Blood from Arm, Left Updated:  10/25/18 1622     WBC 13 40 (H) Thousand/uL      RBC 4 46 Million/uL      Hemoglobin 14 2 g/dL      Hematocrit 42 5 %      MCV 95 fL      MCH 31 9 pg      MCHC 33 4 g/dL      RDW 12 3 %      MPV 8 4 (L) fL      Platelets 970 Thousands/uL      nRBC 0 /100 WBCs      Neutrophils Relative 79 (H) %      Lymphocytes Relative 15 (L) %      Monocytes Relative 6 %      Eosinophils Relative 0 %      Basophils Relative 1 %      Neutrophils Absolute 10 60 (H) Thousands/µL      Lymphocytes Absolute 2 00 Thousands/µL      Monocytes Absolute 0 80 Thousand/µL      Eosinophils Absolute 0 00 Thousand/µL      Basophils Absolute 0 10 Thousands/µL                  CT abdomen pelvis with contrast   Final Result by Mandie Duff (10/25 1818)   Small nonobstructing stone in the lower pole of each kidney   Colonic diverticulosis but no acute diverticulitis  Signed by SHERRI Batista  CT head without contrast   Final Result by Mandie Duff (10/25 1755)   No acute intracranial findings  If symptoms persist or if further imaging   is clinically indicated, consider follow-up CT or MRI  Low attenuation in the white matter bilaterally, age indeterminate  This   is most commonly from small vessel ischemic disease         Signed by SHERRI Batista  Procedures  Procedures       Phone Contacts  ED Phone Contact    ED Course  ED Course as of Oct 25 1903   Thu Oct 25, 2018   [id number] Discussed with patient result as well as incidental findings on CT scans    Recommended follow-up with PCP for pulmonary nodule re-evaluation as well as following of additional incidental is as noted in discharge paperwork  Advised patient of any acute changes worsening symptoms return to emergency room  Advised to follow up with his eye doctor he states he has not seen 1 in many years                                  MDM  Number of Diagnoses or Management Options     Amount and/or Complexity of Data Reviewed  Clinical lab tests: ordered and reviewed  Tests in the radiology section of CPT®: ordered and reviewed  Independent visualization of images, tracings, or specimens: yes    Risk of Complications, Morbidity, and/or Mortality  Presenting problems: moderate  Diagnostic procedures: moderate  Management options: moderate    Patient Progress  Patient progress: stable    CritCare Time    Disposition  Final diagnoses:   Abdominal pain   Renal calculus, bilateral nonobstructing   Liver cyst noted on CT   Nodule of right lung 3mm   Diverticulosis   Elevated blood pressure reading   Small vessel disease noted on Ct head     Time reflects when diagnosis was documented in both MDM as applicable and the Disposition within this note     Time User Action Codes Description Comment    10/25/2018  6:26 PM Charolett Lass Add [R10 9] Abdominal pain     10/25/2018  6:26 PM Charolett Lass Add [N20 0] Renal calculus, bilateral     10/25/2018  6:26 PM Charolett Lass Modify [N20 0] Renal calculus, bilateral nonobstructing     10/25/2018  6:27 PM Charolett Lass Add [K76 89] Liver cyst     10/25/2018  6:27 PM Tiffany POLANCO Modify [N08 96] Liver cyst noted on CT     10/25/2018  6:27 PM Tiffany POLANCO Add [R91 1] Nodule of right lung     10/25/2018  6:27 PM Charolett Lass Modify [R91 1] Nodule of right lung 3mm     10/25/2018  6:27 PM Charolett Lass Add [K57 90] Diverticulosis     10/25/2018  6:28 PM Charolett Lass Add [R03 0] Elevated blood pressure reading     10/25/2018  6:33 PM Charolett Lass Add [I99 9] Small vessel disease     10/25/2018  6:33 PM Tiffany POLANCO Modify [I99 9] Small vessel disease noted on Ct head       ED Disposition     ED Disposition Condition Comment    Discharge  Vicki Pleasant discharge to home/self care  Condition at discharge: Stable        Follow-up Information     Follow up With Specialties Details Why [address] [place], DO Nephrology Schedule an appointment as soon as possible for a visit in 3 days  [address]  [facility] [address]  [phone]      [facility] Schedule an appointment as soon as possible for a visit If symptoms worsen [address] [place] [id number] [phone]            Patient's Medications   Discharge Prescriptions    CIPROFLOXACIN (CIPRO) 500 MG TABLET    Take 1 tablet (500 mg total) by mouth every 12 (twelve) hours for 7 days       Start Date: 10/25/2018End Date: 11/1/2018       Order Dose: 500 mg       Quantity: 14 tablet    Refills: 0     No discharge procedures on file      ED Provider  Electronically Signed by           Cristiano Paris PA-C  10/25/18 1897

## 2019-02-25 ENCOUNTER — HOSPITAL ENCOUNTER (EMERGENCY)
Facility: HOSPITAL | Age: 54
Discharge: HOME | End: 2019-02-25
Attending: EMERGENCY MEDICINE | Admitting: EMERGENCY MEDICINE
Payer: COMMERCIAL

## 2019-02-25 ENCOUNTER — APPOINTMENT (EMERGENCY)
Dept: CT IMAGING | Facility: HOSPITAL | Age: 54
End: 2019-02-25
Payer: COMMERCIAL

## 2019-02-25 VITALS
BODY MASS INDEX: 26.48 KG/M2 | SYSTOLIC BLOOD PRESSURE: 131 MMHG | DIASTOLIC BLOOD PRESSURE: 87 MMHG | TEMPERATURE: 97.6 F | HEART RATE: 84 BPM | OXYGEN SATURATION: 93 % | RESPIRATION RATE: 18 BRPM | HEIGHT: 70 IN | WEIGHT: 185 LBS

## 2019-02-25 DIAGNOSIS — D72.829 LEUKOCYTOSIS: ICD-10-CM

## 2019-02-25 DIAGNOSIS — R10.9 ABDOMINAL PAIN OF UNKNOWN CAUSE: Primary | ICD-10-CM

## 2019-02-25 DIAGNOSIS — R11.2 NAUSEA AND VOMITING: ICD-10-CM

## 2019-02-25 LAB
ALBUMIN SERPL BCP-MCNC: 4.5 G/DL (ref 3.5–5.7)
ALP SERPL-CCNC: 60 U/L (ref 40–150)
ALT SERPL W P-5'-P-CCNC: 31 U/L (ref 7–52)
ANION GAP SERPL CALCULATED.3IONS-SCNC: 9 MMOL/L (ref 4–13)
APTT PPP: 55 SECONDS (ref 26–38)
AST SERPL W P-5'-P-CCNC: 32 U/L (ref 13–39)
BACTERIA UR QL AUTO: ABNORMAL /HPF
BASOPHILS # BLD AUTO: 0.1 THOUSANDS/ΜL (ref 0–0.1)
BASOPHILS NFR BLD AUTO: 1 % (ref 0–2)
BILIRUB SERPL-MCNC: 0.5 MG/DL (ref 0.2–1)
BILIRUB UR QL STRIP: NEGATIVE
BUN SERPL-MCNC: 24 MG/DL (ref 7–25)
CALCIUM SERPL-MCNC: 9.9 MG/DL (ref 8.6–10.5)
CHLORIDE SERPL-SCNC: 101 MMOL/L (ref 98–107)
CLARITY UR: CLEAR
CO2 SERPL-SCNC: 25 MMOL/L (ref 21–31)
COLOR UR: YELLOW
CREAT SERPL-MCNC: 1.01 MG/DL (ref 0.7–1.3)
EOSINOPHIL # BLD AUTO: 0.1 THOUSAND/ΜL (ref 0–0.61)
EOSINOPHIL NFR BLD AUTO: 1 % (ref 0–5)
ERYTHROCYTE [DISTWIDTH] IN BLOOD BY AUTOMATED COUNT: 12.6 % (ref 11.5–14.5)
GFR SERPL CREATININE-BSD FRML MDRD: 85 ML/MIN/1.73SQ M
GLUCOSE SERPL-MCNC: 154 MG/DL (ref 65–99)
GLUCOSE UR STRIP-MCNC: NEGATIVE MG/DL
HCT VFR BLD AUTO: 43.5 % (ref 42–47)
HGB BLD-MCNC: 14.8 G/DL (ref 14–18)
HGB UR QL STRIP.AUTO: ABNORMAL
INR PPP: 1.01 (ref 0.9–1.5)
KETONES UR STRIP-MCNC: ABNORMAL MG/DL
LEUKOCYTE ESTERASE UR QL STRIP: NEGATIVE
LIPASE SERPL-CCNC: 120 U/L (ref 11–82)
LYMPHOCYTES # BLD AUTO: 1.2 THOUSANDS/ΜL (ref 0.6–4.47)
LYMPHOCYTES NFR BLD AUTO: 10 % (ref 21–51)
MCH RBC QN AUTO: 31.7 PG (ref 26–34)
MCHC RBC AUTO-ENTMCNC: 33.9 G/DL (ref 31–37)
MCV RBC AUTO: 93 FL (ref 81–99)
MONOCYTES # BLD AUTO: 1.4 THOUSAND/ΜL (ref 0.17–1.22)
MONOCYTES NFR BLD AUTO: 12 % (ref 2–12)
NEUTROPHILS # BLD AUTO: 9.6 THOUSANDS/ΜL (ref 1.4–6.5)
NEUTS SEG NFR BLD AUTO: 77 % (ref 42–75)
NITRITE UR QL STRIP: NEGATIVE
NON-SQ EPI CELLS URNS QL MICRO: ABNORMAL /HPF
NRBC BLD AUTO-RTO: 0 /100 WBCS
PH UR STRIP.AUTO: 6.5 [PH] (ref 5–8)
PLATELET # BLD AUTO: 337 THOUSANDS/UL (ref 149–390)
PMV BLD AUTO: 8 FL (ref 8.6–11.7)
POTASSIUM SERPL-SCNC: 4 MMOL/L (ref 3.5–5.5)
PROT SERPL-MCNC: 7.7 G/DL (ref 6.4–8.9)
PROT UR STRIP-MCNC: NEGATIVE MG/DL
PROTHROMBIN TIME: 11.7 SECONDS (ref 10.2–13)
RBC # BLD AUTO: 4.67 MILLION/UL (ref 4.3–5.9)
RBC #/AREA URNS AUTO: ABNORMAL /HPF
SODIUM SERPL-SCNC: 135 MMOL/L (ref 134–143)
SP GR UR STRIP.AUTO: 1.01 (ref 1–1.03)
UROBILINOGEN UR QL STRIP.AUTO: 0.2 E.U./DL
WBC # BLD AUTO: 12.4 THOUSAND/UL (ref 4.8–10.8)
WBC #/AREA URNS AUTO: ABNORMAL /HPF

## 2019-02-25 PROCEDURE — 80053 COMPREHEN METABOLIC PANEL: CPT | Performed by: EMERGENCY MEDICINE

## 2019-02-25 PROCEDURE — 74177 CT ABD & PELVIS W/CONTRAST: CPT

## 2019-02-25 PROCEDURE — 83690 ASSAY OF LIPASE: CPT | Performed by: EMERGENCY MEDICINE

## 2019-02-25 PROCEDURE — 36415 COLL VENOUS BLD VENIPUNCTURE: CPT | Performed by: EMERGENCY MEDICINE

## 2019-02-25 PROCEDURE — 85610 PROTHROMBIN TIME: CPT | Performed by: EMERGENCY MEDICINE

## 2019-02-25 PROCEDURE — 99284 EMERGENCY DEPT VISIT MOD MDM: CPT

## 2019-02-25 PROCEDURE — 85730 THROMBOPLASTIN TIME PARTIAL: CPT | Performed by: EMERGENCY MEDICINE

## 2019-02-25 PROCEDURE — 96375 TX/PRO/DX INJ NEW DRUG ADDON: CPT

## 2019-02-25 PROCEDURE — 96374 THER/PROPH/DIAG INJ IV PUSH: CPT

## 2019-02-25 PROCEDURE — 96376 TX/PRO/DX INJ SAME DRUG ADON: CPT

## 2019-02-25 PROCEDURE — 96361 HYDRATE IV INFUSION ADD-ON: CPT

## 2019-02-25 PROCEDURE — 81001 URINALYSIS AUTO W/SCOPE: CPT | Performed by: EMERGENCY MEDICINE

## 2019-02-25 PROCEDURE — 85025 COMPLETE CBC W/AUTO DIFF WBC: CPT | Performed by: EMERGENCY MEDICINE

## 2019-02-25 RX ORDER — MORPHINE SULFATE 4 MG/ML
4 INJECTION, SOLUTION INTRAMUSCULAR; INTRAVENOUS ONCE
Status: COMPLETED | OUTPATIENT
Start: 2019-02-25 | End: 2019-02-25

## 2019-02-25 RX ORDER — ONDANSETRON 2 MG/ML
4 INJECTION INTRAMUSCULAR; INTRAVENOUS ONCE
Status: COMPLETED | OUTPATIENT
Start: 2019-02-25 | End: 2019-02-25

## 2019-02-25 RX ORDER — ONDANSETRON 4 MG/1
4 TABLET, FILM COATED ORAL EVERY 8 HOURS PRN
Qty: 12 TABLET | Refills: 0 | Status: SHIPPED | OUTPATIENT
Start: 2019-02-25

## 2019-02-25 RX ADMIN — IOHEXOL 100 ML: 350 INJECTION, SOLUTION INTRAVENOUS at 09:25

## 2019-02-25 RX ADMIN — SODIUM CHLORIDE 1000 ML: 0.9 INJECTION, SOLUTION INTRAVENOUS at 07:55

## 2019-02-25 RX ADMIN — ONDANSETRON 4 MG: 2 INJECTION INTRAMUSCULAR; INTRAVENOUS at 10:01

## 2019-02-25 RX ADMIN — MORPHINE SULFATE 4 MG: 4 INJECTION INTRAVENOUS at 10:04

## 2019-02-25 RX ADMIN — ONDANSETRON 4 MG: 2 INJECTION INTRAMUSCULAR; INTRAVENOUS at 07:57

## 2019-02-25 RX ADMIN — MORPHINE SULFATE 4 MG: 4 INJECTION INTRAVENOUS at 07:56

## 2019-02-25 NOTE — ED PROVIDER NOTES
History  Chief Complaint   Patient presents with    Abdominal Pain     Patient is a 52-year-old male history of AML status post bone transplant in 2010 with history of C diff colitis diverticulosis and intrarenal stones who presents the emergency department complaining of abdominal pain nausea started about 4:00 a m  This morning still present worsening  History provided by:  Patient and significant other  Abdominal Pain   Pain location:  Generalized  Pain quality: aching and cramping    Pain radiates to:  Does not radiate  Onset quality:  Sudden  Duration: 3  Timing:  Constant  Progression:  Worsening  Chronicity:  New  Associated symptoms: fatigue, nausea and vomiting    Associated symptoms: no chest pain, no chills, no constipation, no cough, no diarrhea, no dysuria, no fever, no hematuria, no shortness of breath and no sore throat        Prior to Admission Medications   Prescriptions Last Dose Informant Patient Reported? Taking?    Cholecalciferol (VITAMIN D PO) Past Week at Unknown time  Yes Yes   Sig: Take by mouth daily   MAGNESIUM PO 2/24/2019 at Unknown time  Yes Yes   Sig: Take by mouth daily   acyclovir (ZOVIRAX) 400 MG tablet 2/24/2019 at Unknown time  Yes Yes   Sig: Take 400 mg by mouth 3 (three) times a day   tacrolimus (PROGRAF) 1 mg capsule 2/24/2019 at Unknown time  Yes Yes   Sig: Take 1 mg by mouth every 12 (twelve) hours      Facility-Administered Medications: None       Past Medical History:   Diagnosis Date    [facility]     AML- [facility] marow [facility] 2010 [facility] Clostridium difficile colitis 2009    Diverticulitis     [facility] ) 2010       Past Surgical History:   Procedure Laterality Date    BONE MARROW TRANSPLANT      COLONOSCOPY  2015    HERNIA REPAIR Bilateral 2003    inguinal    [place] ESOPHAGOGASTRODUODENOSCOPY TRANSORAL DIAGNOSTIC N/A 7/31/2018    Procedure: ESOPHAGOGASTRODUODENOSCOPY (EGD) with bx;  Surgeon: Nickolas Matute MD;  Location: [facility]; Service: Gastroenterology       History reviewed  No pertinent family history  I have reviewed and agree with the history as documented  Social History     Tobacco Use    Smoking status: Never Smoker    Smokeless tobacco: Never Used   Substance Use Topics    Alcohol use: Yes     Comment: 1-2 drinks per week    Drug use: No        Review of Systems   Constitutional: Positive for fatigue  Negative for activity change, appetite change, chills and fever  HENT: Negative for congestion, ear pain, rhinorrhea and sore throat  Eyes: Negative for discharge, redness and visual disturbance  Respiratory: Negative for cough, chest tightness, shortness of breath and wheezing  Cardiovascular: Negative for chest pain and palpitations  Gastrointestinal: Positive for abdominal pain, nausea and vomiting  Negative for constipation and diarrhea  Endocrine: Negative for polydipsia and polyuria  Genitourinary: Negative for difficulty urinating, dysuria, frequency, hematuria and urgency  Musculoskeletal: Negative for arthralgias and myalgias  Skin: Negative for color change, pallor and rash  Neurological: Negative for dizziness, weakness, light-headedness, numbness and headaches  Hematological: Negative for adenopathy  Does not bruise/bleed easily  All other systems reviewed and are negative  Physical Exam  Physical Exam   Constitutional: He is oriented to person, place, and time  He appears well-developed and well-nourished  HENT:   Head: Normocephalic and atraumatic  Right Ear: External ear normal    Left Ear: External ear normal    Nose: Nose normal    Mouth/Throat: Oropharynx is clear and moist    Eyes: Pupils are equal, round, and reactive to light  Conjunctivae and EOM are normal    Neck: Normal range of motion  Neck supple  Cardiovascular: Normal rate, regular rhythm, normal heart sounds and intact distal pulses     Pulmonary/Chest: Effort normal and breath sounds normal  No respiratory distress  He has no wheezes  He has no rales  He exhibits no tenderness  Abdominal: Soft  Bowel sounds are normal  He exhibits no distension  There is generalized tenderness  There is rebound and guarding  There is no rigidity  Musculoskeletal: Normal range of motion  Neurological: He is alert and oriented to person, place, and time  No cranial nerve deficit or sensory deficit  Skin: Skin is warm and dry  Psychiatric: He has a normal mood and affect  Nursing note and vitals reviewed        Vital Signs  ED Triage Vitals [02/25/19 0748]   Temperature Pulse Respirations Blood Pressure SpO2   97 6 °F (36 4 °C) 70 18 131/87 98 %      Temp Source Heart Rate Source Patient Position - Orthostatic VS BP Location FiO2 (%)   Temporal Monitor Lying Left arm --      Pain Score       8           Vitals:    02/25/19 0748   BP: 131/87   Pulse: 70   Patient Position - Orthostatic VS: Lying       Visual Acuity      ED Medications  Medications   sodium chloride 0 9 % bolus 1,000 mL (0 mL Intravenous Stopped 2/25/19 0843)   morphine (PF) 4 mg/mL injection 4 mg (4 mg Intravenous Given 2/25/19 0756)   ondansetron (ZOFRAN) injection 4 mg (4 mg Intravenous Given 2/25/19 0757)   iohexol (OMNIPAQUE) 350 MG/ML injection (MULTI-DOSE) 100 mL (100 mL Intravenous Given 2/25/19 0925)   morphine (PF) 4 mg/mL injection 4 mg (4 mg Intravenous Given 2/25/19 1004)   ondansetron (ZOFRAN) injection 4 mg (4 mg Intravenous Given 2/25/19 1001)       Diagnostic Studies  Results Reviewed     Procedure Component Value Units Date/Time    Urine Microscopic [[id number]]  (Abnormal) Collected:  02/25/19 0945    Lab Status:  Final result Specimen:  Urine, Clean Catch Updated:  02/25/19 1008     RBC, UA 2-4 /hpf      WBC, UA 0-1 /hpf      Epithelial Cells Occasional /hpf      Bacteria, UA None Seen /hpf     UA w Reflex to Microscopic w Reflex to Culture [[id number]]  (Abnormal) Collected:  02/25/19 0945    Lab Status:  Final result Specimen: Urine, Clean Catch Updated:  02/25/19 0952     Color, UA Yellow     Clarity, UA Clear     Specific Gravity, UA 1 015     pH, UA 6 5     Leukocytes, UA Negative     Nitrite, UA Negative     Protein, UA Negative mg/dl      Glucose, UA Negative mg/dl      Ketones, UA 15 (1+) mg/dl      Urobilinogen, UA 0 2 E U /dl      Bilirubin, UA Negative     Blood, UA Trace-Intact    Comprehensive metabolic panel [[id number]]  (Abnormal) Collected:  02/25/19 0755    Lab Status:  Final result Specimen:  Blood from Arm, Right Updated:  02/25/19 5756     Sodium 135 mmol/L      Potassium 4 0 mmol/L      Chloride 101 mmol/L      CO2 25 mmol/L      ANION GAP 9 mmol/L      BUN 24 mg/dL      Creatinine 1 01 mg/dL      Glucose 154 mg/dL      Calcium 9 9 mg/dL      AST 32 U/L      ALT 31 U/L      Alkaline Phosphatase 60 U/L      Total Protein 7 7 g/dL      Albumin 4 5 g/dL      Total Bilirubin 0 50 mg/dL      eGFR 85 ml/min/1 73sq m     Narrative:       National Kidney Disease Education Program recommendations are as follows:  GFR calculation is accurate only with a steady state creatinine  Chronic Kidney disease less than 60 ml/min/1 73 sq  meters  Kidney failure less than 15 ml/min/1 73 sq  meters      Lipase [[id number]]  (Abnormal) Collected:  02/25/19 0755    Lab Status:  Final result Specimen:  Blood from Arm, Right Updated:  02/25/19 0821     Lipase 120 u/L     Protime-INR [[id number]]  (Normal) Collected:  02/25/19 0755    Lab Status:  Final result Specimen:  Blood from Arm, Right Updated:  02/25/19 0813     Protime 11 7 seconds      INR 1 01    APTT [[id number]]  (Abnormal) Collected:  02/25/19 0755    Lab Status:  Final result Specimen:  Blood from Arm, Right Updated:  02/25/19 0813     PTT 55 seconds     CBC and differential [[id number]]  (Abnormal) Collected:  02/25/19 0755    Lab Status:  Final result Specimen:  Blood from Arm, Right Updated:  02/25/19 0805     WBC 12 40 Thousand/uL      RBC 4 67 Million/uL      Hemoglobin 14 8 g/dL Hematocrit 43 5 %      MCV 93 fL      MCH 31 7 pg      MCHC 33 9 g/dL      RDW 12 6 %      MPV 8 0 fL      Platelets 781 Thousands/uL      nRBC 0 /100 WBCs      Neutrophils Relative 77 %      Lymphocytes Relative 10 %      Monocytes Relative 12 %      Eosinophils Relative 1 %      Basophils Relative 1 %      Neutrophils Absolute 9 60 Thousands/µL      Lymphocytes Absolute 1 20 Thousands/µL      Monocytes Absolute 1 40 Thousand/µL      Eosinophils Absolute 0 10 Thousand/µL      Basophils Absolute 0 10 Thousands/µL                  CT abdomen pelvis with contrast   Final Result by Didi Ugaret MD (02/25 1004)      Stable bilateral nonobstructing nephrolithiasis  No ureterolithiasis  Stable renal cortical cysts and probable liver cyst       Diverticulosis without evidence for diverticulitis  No acute abnormality identified  Workstation performed: [id number]                    Procedures  Procedures       Phone Contacts  ED Phone Contact    ED Course                               MDM  Number of Diagnoses or Management Options  Abdominal pain of unknown cause: new and requires workup  Leukocytosis: new and requires workup  Nausea and vomiting: new and requires workup  Diagnosis management comments: Patient remained clinically and hemodynamically stable in the emergency department workup is essentially unremarkable except for slight leukocytosis suspect viral gastroenteritis or food poisoning patient feels improved after meds and fluids given in the emergency department remains stable and by Zofran rasp plenty of fluids and follow up with primary physician for re-evaluation return precautions and anticipatory guidance discussed           Amount and/or Complexity of Data Reviewed  Clinical lab tests: ordered and reviewed  Tests in the radiology section of CPT®: ordered and reviewed  Tests in the medicine section of CPT®: ordered and reviewed  Decide to obtain previous medical records or to obtain history from someone other than the patient: yes  Review and summarize past medical records: yes  Independent visualization of images, tracings, or specimens: yes    Risk of Complications, Morbidity, and/or Mortality  Presenting problems: moderate  Management options: moderate    Patient Progress  Patient progress: stable      Disposition  Final diagnoses:   Abdominal pain of unknown cause   Nausea and vomiting   Leukocytosis     Time reflects when diagnosis was documented in both MDM as applicable and the Disposition within this note     Time User Action Codes Description Comment    2/25/2019 10:11 AM Watt Lashon Add [R10 9] Abdominal pain of unknown cause     2/25/2019 10:12 AM Brian Goel Add [R11 2] Nausea and vomiting     2/25/2019 10:12 AM Watt Lashon Add [Z73 653] Leukocytosis       ED Disposition     ED Disposition Condition Date/Time Comment    Discharge Stable Mon Feb 25, 2019 10:11 AM Vicki Aden discharge to home/self care  Follow-up Information     Follow up With Specialties Details Why Contact Info    Steve Aguirre DO Nephrology Schedule an appointment as soon as possible for a visit in 3 days  [address] [place] [address]            Patient's Medications   Discharge Prescriptions    ONDANSETRON (ZOFRAN) 4 MG TABLET    Take 1 tablet (4 mg total) by mouth every 8 (eight) hours as needed for nausea or vomiting       Start Date: 2/25/2019 End Date: --       Order Dose: 4 mg       Quantity: 12 tablet    Refills: 0     No discharge procedures on file      ED Provider  Electronically Signed by           Stevan Ga DO  02/25/19 5685

## 2019-02-25 NOTE — ED NOTES
Pt states he started with abdominal pain and vomiting since 0400 hours this AM  Pt was concerned as he has had diverticulitis in the past and also acid reflux (pt saw GI doctor and had scope to confirm)  Pt not currently on medications for acid reflux as he was told he could stop taking the medications        Obdulio Santana RN  02/25/19 2945

## 2019-10-22 ENCOUNTER — TELEPHONE (OUTPATIENT)
Dept: NEPHROLOGY | Facility: CLINIC | Age: 54
End: 2019-10-22

## 2019-10-22 NOTE — TELEPHONE ENCOUNTER
Keli Slaughter called and stated that he was moving air conditioners on Friday and hurt his wrist  He believes it is just a sprain  He has been icing it and been putting an ace bandage around it but he woke up this morning and it feels worse than when he hurt it  He wanted to know what he should do  He is also requesting a refill on his Cetaminaphin #3 tablet Oxycodone (last script was written in 2018)  He takes 1 a day as needed

## 2019-10-25 NOTE — TELEPHONE ENCOUNTER
I apologize, after we spoke yesterday acute not recall the patient wanted to have an x-ray of his wrist performed    Please let me know if x-rays are needed

## 2019-10-25 NOTE — TELEPHONE ENCOUNTER
I spoke with Gavin's wife and she stated that there wasn't any need for an xray because they felt it was just a sprain

## 2020-09-24 ENCOUNTER — APPOINTMENT (EMERGENCY)
Dept: CT IMAGING | Facility: HOSPITAL | Age: 55
End: 2020-09-24
Payer: COMMERCIAL

## 2020-09-24 ENCOUNTER — HOSPITAL ENCOUNTER (EMERGENCY)
Facility: HOSPITAL | Age: 55
Discharge: HOME | End: 2020-09-24
Attending: EMERGENCY MEDICINE | Admitting: EMERGENCY MEDICINE
Payer: COMMERCIAL

## 2020-09-24 ENCOUNTER — APPOINTMENT (EMERGENCY)
Dept: RADIOLOGY | Facility: HOSPITAL | Age: 55
End: 2020-09-24
Payer: COMMERCIAL

## 2020-09-24 VITALS
RESPIRATION RATE: 16 BRPM | HEART RATE: 52 BPM | BODY MASS INDEX: 26.48 KG/M2 | SYSTOLIC BLOOD PRESSURE: 125 MMHG | WEIGHT: 185 LBS | DIASTOLIC BLOOD PRESSURE: 63 MMHG | OXYGEN SATURATION: 98 % | HEIGHT: 70 IN | TEMPERATURE: 97.5 F

## 2020-09-24 DIAGNOSIS — S62.102A LEFT WRIST FRACTURE: Primary | ICD-10-CM

## 2020-09-24 PROCEDURE — 96372 THER/PROPH/DIAG INJ SC/IM: CPT

## 2020-09-24 PROCEDURE — 73200 CT UPPER EXTREMITY W/O DYE: CPT

## 2020-09-24 PROCEDURE — 99284 EMERGENCY DEPT VISIT MOD MDM: CPT

## 2020-09-24 PROCEDURE — 70450 CT HEAD/BRAIN W/O DYE: CPT

## 2020-09-24 PROCEDURE — G1004 CDSM NDSC: HCPCS

## 2020-09-24 PROCEDURE — 73110 X-RAY EXAM OF WRIST: CPT

## 2020-09-24 PROCEDURE — 99285 EMERGENCY DEPT VISIT HI MDM: CPT | Performed by: PHYSICIAN ASSISTANT

## 2020-09-24 PROCEDURE — 29125 APPL SHORT ARM SPLINT STATIC: CPT | Performed by: PHYSICIAN ASSISTANT

## 2020-09-24 PROCEDURE — 70486 CT MAXILLOFACIAL W/O DYE: CPT

## 2020-09-24 RX ORDER — ACETAMINOPHEN 325 MG/1
650 TABLET ORAL ONCE
Status: COMPLETED | OUTPATIENT
Start: 2020-09-24 | End: 2020-09-24

## 2020-09-24 RX ORDER — OXYCODONE HYDROCHLORIDE AND ACETAMINOPHEN 5; 325 MG/1; MG/1
1 TABLET ORAL ONCE
Status: COMPLETED | OUTPATIENT
Start: 2020-09-24 | End: 2020-09-24

## 2020-09-24 RX ORDER — KETOROLAC TROMETHAMINE 30 MG/ML
15 INJECTION, SOLUTION INTRAMUSCULAR; INTRAVENOUS ONCE
Status: COMPLETED | OUTPATIENT
Start: 2020-09-24 | End: 2020-09-24

## 2020-09-24 RX ORDER — OXYCODONE HYDROCHLORIDE AND ACETAMINOPHEN 5; 325 MG/1; MG/1
1 TABLET ORAL EVERY 4 HOURS PRN
Qty: 10 TABLET | Refills: 0 | Status: SHIPPED | OUTPATIENT
Start: 2020-09-24 | End: 2020-10-05 | Stop reason: SDUPTHER

## 2020-09-24 RX ADMIN — KETOROLAC TROMETHAMINE 15 MG: 30 INJECTION, SOLUTION INTRAMUSCULAR at 09:23

## 2020-09-24 RX ADMIN — OXYCODONE HYDROCHLORIDE AND ACETAMINOPHEN 1 TABLET: 5; 325 TABLET ORAL at 09:58

## 2020-09-24 RX ADMIN — ACETAMINOPHEN 650 MG: 325 TABLET ORAL at 09:22

## 2020-09-24 NOTE — ED PROVIDER NOTES
History  Chief Complaint   Patient presents with    Fall     patient reports tripping on rocks and landing on left wrist  Patient also states he hit his head on a rock  No LOC no blood thinners     64-year-old male history of AML status post stem cell transplant presents accompanied by his wife via private vehicle after suffering a fall  Patient reports that he was walking on rocks while holding a coffee in his right hand and slipped on a rock hitting the anterior surface of his left eye and left wrist   There is a small deformity to his left wrist and ecchymosis to the soft tissue under his left eye  Patient denies loss of consciousness, neck pain or hitting his neck, back or ribs  He denies any preceding illness prior to the fall  Denies feeling dizzy, nauseous or having any chest pain or palpitations prior to the fall  He describes a mechanical fall in nature  Denies any chest pain, shortness of breath, abdominal pain or any other complaints at this time  Prior to Admission Medications   Prescriptions Last Dose Informant Patient Reported? Taking?    Cholecalciferol (VITAMIN D PO)   Yes No   Sig: Take by mouth daily   MAGNESIUM PO   Yes No   Sig: Take by mouth daily   acyclovir (ZOVIRAX) 400 MG tablet   Yes No   Sig: Take 400 mg by mouth 3 (three) times a day   ondansetron (ZOFRAN) 4 mg tablet   No No   Sig: Take 1 tablet (4 mg total) by mouth every 8 (eight) hours as needed for nausea or vomiting   tacrolimus (PROGRAF) 1 mg capsule   Yes No   Sig: Take 1 mg by mouth every 12 (twelve) hours      Facility-Administered Medications: None       Past Medical History:   Diagnosis Date    [facility] ([facility] )     AML- [facility] marow transplant 2010 at [facility] Clostridium difficile colitis 2009    Diverticulitis     [facility]       Past Surgical History:   Procedure Laterality Date    BONE MARROW TRANSPLANT      COLONOSCOPY  2015    HERNIA REPAIR Bilateral 2003    inguinal    [place] ESOPHAGOGASTRODUODENOSCOPY TRANSORAL DIAGNOSTIC N/A 7/31/2018    Procedure: ESOPHAGOGASTRODUODENOSCOPY (EGD) with bx;  Surgeon: Kae Mackenzie MD;  Location: [facility]; Service: Gastroenterology       History reviewed  No pertinent family history  I have reviewed and agree with the history as documented  E-Cigarette/Vaping     E-Cigarette/Vaping Substances     Social History     Tobacco Use    Smoking status: Never Smoker    Smokeless tobacco: Never Used   Substance Use Topics    Alcohol use: Yes     Comment: 1-2 drinks per week    Drug use: No       Review of Systems   Constitutional: Negative for chills, fatigue and fever  HENT: Negative for congestion and sore throat  Eyes: Negative for pain  Respiratory: Negative for cough, chest tightness, shortness of breath and wheezing  Cardiovascular: Negative for chest pain, palpitations and leg swelling  Gastrointestinal: Negative for abdominal pain, constipation, diarrhea, nausea and vomiting  Endocrine: Negative for polyuria  Genitourinary: Negative for dysuria  Musculoskeletal: Positive for arthralgias  Negative for back pain, myalgias and neck pain  Skin: Negative for rash  Neurological: Negative for dizziness, syncope, light-headedness and headaches  All other systems reviewed and are negative  Physical Exam  Physical Exam  Vitals signs reviewed  Constitutional:       Appearance: He is well-developed  HENT:      Head: Normocephalic  Eyes:      Comments: Left infraorbital ecchymosis and edema   Neck:      Musculoskeletal: Normal range of motion  Cardiovascular:      Rate and Rhythm: Normal rate and regular rhythm  Heart sounds: Normal heart sounds  Pulmonary:      Effort: Pulmonary effort is normal       Breath sounds: Normal breath sounds  Abdominal:      General: Bowel sounds are normal       Palpations: Abdomen is soft  Tenderness: There is no abdominal tenderness     Musculoskeletal: Normal range of motion  Comments: Left distal ulnar wrist deformity  Moderate edema  Significantly tender to palpation  Distal sensory motor function intact  Cap refill less than 2 seconds  Skin:     General: Skin is warm and dry  Capillary Refill: Capillary refill takes less than 2 seconds  Neurological:      Mental Status: He is alert and oriented to person, place, and time  Vital Signs  ED Triage Vitals [09/24/20 0914]   Temperature Pulse Respirations Blood Pressure SpO2   97 5 °F (36 4 °C) (!) 52 16 125/63 98 %      Temp Source Heart Rate Source Patient Position - Orthostatic VS BP Location FiO2 (%)   Temporal Monitor Sitting Left arm --      Pain Score       Worst Possible Pain           Vitals:    09/24/20 0914   BP: 125/63   Pulse: (!) 52   Patient Position - Orthostatic VS: Sitting         Visual Acuity      ED Medications  Medications   ketorolac (TORADOL) injection 15 mg (15 mg Intramuscular Given 9/24/20 0923)   acetaminophen (TYLENOL) tablet 650 mg (650 mg Oral Given 9/24/20 0922)   oxyCODONE-acetaminophen (PERCOCET) 5-325 mg per tablet 1 tablet (1 tablet Oral Given 9/24/20 8949)       Diagnostic Studies  Results Reviewed     None                 CT upper extremity wo contrast left   Final Result by Abigail Conner MD (09/24 6096)      1  Comminuted intra-articular distal radial fracture as described   2  Ulnar styloid avulsion   3  Tiny ossific body volar to the capitate, likely small avulsive fragment         Workstation performed: [id number]         XR wrist 3+ views LEFT   ED Interpretation by Holly Lewis PA-C (09/24 5415)   Distal radius fracture      Final Result by Teo Lazaro MD (09/24 9764)      Comminuted left intra-articular distal radial fracture and ulnar styloid fracture      Workstation performed: [id number]         CT facial bones without contrast   Final Result by Abigail Conner MD (09/24 0299)      No evidence of acute traumatic injury to the facial bones  Workstation performed: [id number]         CT head without contrast   Final Result by Makenna Pickering MD (09/24 1000)      No acute intracranial abnormality  Workstation performed: [id number]                    Procedures  Splint application    Date/Time: 9/24/2020 11:53 AM  Performed by: Candy Kennedy PA-C  Authorized by: Candy Kennedy PA-C     Patient location:  Bedside and ED  Performing Provider:  PA and RN  Other Assisting Provider: Yes (comment)    Consent:     Consent obtained:  Verbal    Consent given by:  Patient    Risks discussed:  Discoloration, numbness, pain and swelling    Alternatives discussed:  No treatment  Universal protocol:     Procedure explained and questions answered to patient or proxy's satisfaction: yes      Patient identity confirmed:  Verbally with patient  Indication:     Indications: fracture    Pre-procedure details:     Sensation:  Normal  Procedure details:     Laterality:  Left    Location:  Wrist    Wrist:  L wrist    Strapping: no      Splint type:  Wrist    Supplies:  Ortho-Glass  Post-procedure details:     Pain:  Improved    Sensation:  Normal    Patient tolerance of procedure: Tolerated well, no immediate complications             ED Course  ED Course as of Sep 24 1155   Thu Sep 24, 2020   3569 Upon re-evaluation of the patient, he is feeling much better after Toradol and Tylenol however still having significant pain  Will treat with Percocet  Currently pending CT scan results and to hear back from Orthopedics upon recommendation for reduction versus splinting alone  1011 Dr Lizeth Diop recommends CT of wrist with 3D, possibly needs ORIF  States reduction not necessary, splint with office f/u  SBIRT 22yo+      Most Recent Value   SBIRT (22 yo +)   In order to provide better care to our patients, we are screening all of our patients for alcohol and drug use   Would it be okay to ask you these screening questions? No Filed at: 09/24/2020 1142                  [facility]  Number of Diagnoses or Management Options  Left wrist fracture:   Diagnosis management comments: See ED course for details  Left wrist fracture  CT recon performed per orthopedics recommendation  Informed patient of importance to follow-up with orthopedics as casting and/or ORIF may be necessary  Patient agreeable to plan  Pain well controlled in the ED after Percocet  Disposition  Final diagnoses:   Left wrist fracture     Time reflects when diagnosis was documented in both MDM as applicable and the Disposition within this note     Time User Action Codes Description Comment    9/24/2020 11:08 AM Rodolfo Fabian Add [S62 102A] Left wrist fracture       ED Disposition     ED Disposition Condition Date/Time Comment    Discharge Stable Thu Sep 24, 2020 11:08 AM Farideh Escort discharge to home/self care              Follow-up Information     Follow up With Specialties Details Why Contact Info Additional [phone] [facility] Schedule an appointment as soon as possible for a visit   [address] [facility] [address]  [facility] [address] [facility] Brandy Whitley [address] [place] [address], [place], [place], [address]          Discharge Medication List as of 9/24/2020 11:39 AM      START taking these medications    Details   oxyCODONE-acetaminophen (PERCOCET) 5-325 mg per tablet Take 1 tablet by mouth every 4 (four) hours as needed for moderate pain for up to 10 dosesMax Daily Amount: 6 tablets, Starting Thu 9/24/2020, Normal         CONTINUE these medications which have NOT CHANGED    Details   acyclovir (ZOVIRAX) 400 MG tablet Take 400 mg by mouth 3 (three) times a day, Historical Med      Cholecalciferol (VITAMIN D PO) Take by mouth daily, Historical Med      MAGNESIUM PO Take by mouth daily, Historical Med      ondansetron (ZOFRAN) 4 mg tablet Take 1 tablet (4 mg total) by mouth every 8 (eight) hours as needed for nausea or vomiting, Starting Mon 2/25/2019, Print      tacrolimus (PROGRAF) 1 mg capsule Take 1 mg by mouth every 12 (twelve) hours, Historical Med           No discharge procedures on file      PDMP Review     None          ED Provider  Electronically Signed by           Dee Gomez PA-C  09/24/20 0511

## 2020-09-24 NOTE — DISCHARGE INSTRUCTIONS
Take ibuprofen every 6-8 hours while your symptoms last and use Percocet only as needed for severe pain  Call and schedule a follow-up appointment with Orthopedics and let them know that you got a CT done in the ER for your wrist fracture  You may develop concussion like symptoms of headache, nausea and sensitivity to light within the next few days  Take extra precaution to not suffer any other head injuries and rest until symptoms resolved

## 2020-09-28 ENCOUNTER — APPOINTMENT (OUTPATIENT)
Dept: RADIOLOGY | Facility: MEDICAL CENTER | Age: 55
End: 2020-09-28
Payer: COMMERCIAL

## 2020-09-28 ENCOUNTER — OFFICE VISIT (OUTPATIENT)
Dept: OBGYN CLINIC | Facility: CLINIC | Age: 55
End: 2020-09-28
Payer: COMMERCIAL

## 2020-09-28 VITALS
WEIGHT: 188 LBS | HEIGHT: 70 IN | TEMPERATURE: 98.8 F | DIASTOLIC BLOOD PRESSURE: 79 MMHG | HEART RATE: 79 BPM | BODY MASS INDEX: 26.92 KG/M2 | SYSTOLIC BLOOD PRESSURE: 117 MMHG

## 2020-09-28 DIAGNOSIS — S52.602A CLOSED FRACTURE OF DISTAL ENDS OF LEFT RADIUS AND ULNA, INITIAL ENCOUNTER: ICD-10-CM

## 2020-09-28 DIAGNOSIS — S52.502A CLOSED FRACTURE OF DISTAL ENDS OF LEFT RADIUS AND ULNA, INITIAL ENCOUNTER: ICD-10-CM

## 2020-09-28 DIAGNOSIS — M25.532 PAIN IN LEFT WRIST: Primary | ICD-10-CM

## 2020-09-28 DIAGNOSIS — M25.532 PAIN IN LEFT WRIST: ICD-10-CM

## 2020-09-28 PROCEDURE — 73110 X-RAY EXAM OF WRIST: CPT

## 2020-09-28 PROCEDURE — 25600 CLTX DST RDL FX/EPHYS SEP WO: CPT | Performed by: ORTHOPAEDIC SURGERY

## 2020-09-28 PROCEDURE — 99204 OFFICE O/P NEW MOD 45 MIN: CPT | Performed by: ORTHOPAEDIC SURGERY

## 2020-09-28 NOTE — ASSESSMENT & PLAN NOTE
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

## 2020-09-28 NOTE — PROGRESS NOTES
Assessment:     1  Pain in left wrist    2  Closed fracture of distal ends of left radius and ulna, initial encounter          Plan:     Problem List Items Addressed This Visit        Musculoskeletal and Integument    Closed fracture of left distal radius and ulna     52-year-old male status post mechanical fall from standing now with comminuted, intra-articular left distal radius fracture  Operative versus non operative treatments were discussed  At this time, he would like to pursue non operative treatment  A short-arm, volar and dorsal 3 point molded splint was applied today without issue  Splint precautions were discussed with the patient and his wife  The patient reports that he does not require new work note as 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 due to the [employer] pandemic  We will see the patient back in 1 week  X-rays of the left wrist will be obtained upon arrival   He was encouraged to contact us should questions or concerns arise  Relevant Orders    XR wrist 3+ vw left      Other Visit Diagnoses     Pain in left wrist    -  Primary    Relevant Orders    XR wrist 3+ vw left           Patient ID: Olag Valderrama is a 49 y o  male  Chief Complaint:  Left wrist pain, fracture    HPI:  52-year-old right-hand-dominant male presenting for evaluation of left wrist pain  The patient reports he was camping over the weekend when he tripped and fell while walking in the woods  He noticed immediate left wrist pain and swelling and presented to the ED with his wife shortly after the injury  He also admits to hitting his face off the ground when he fell but denies any loss of consciousness, headaches, nausea/vomiting  X-rays were obtained in the ED and were positive for fracture  The patient was put into a short-arm volar slab splint and asked to follow up at his earliest convenience with Orthopedics      Today, the patient reports pain in the left wrist that has been improved since immobilization  He denies any prior injuries or trauma to the left wrist   He denies numbness or tingling distally  Of note, the patient is status post stem cell transplant for treatment of AML performed in 2010  He was told by his oncologist that he was short but continues to take a small dose of an immunosuppressive medication due to Sicca (Sjogren's) Syndrome      Allergy:  Allergies   Allergen Reactions    Voriconazole Hallucinations and Other (See Comments)     hallucinations      Cefepime      Other reaction(s): Other (See Comments)  Rash, back rash    Cephalosporins Rash    Cyclosporine Rash    Sulfa Antibiotics Rash       Medications:  all current active meds have been reviewed    Past Medical History:  Past Medical History:   Diagnosis Date    Cancer [facility] [address] )     AML- bone marow [facility] 2010 [facility] Clostridium difficile colitis 2009    Diverticulitis     [facility]       Past Surgical History:  Past Surgical History:   Procedure Laterality Date    BONE MARROW TRANSPLANT      COLONOSCOPY  2015    HERNIA REPAIR Bilateral 2003    inguinal    [place] ESOPHAGOGASTRODUODENOSCOPY TRANSORAL DIAGNOSTIC N/A 7/31/2018    Procedure: ESOPHAGOGASTRODUODENOSCOPY (EGD) with bx;  Surgeon: Nickolas Matute MD;  Location: [facility]; Service: Gastroenterology       Family History:  History reviewed  No pertinent family history  Social History:  Social History     Substance and Sexual Activity   Alcohol Use Yes    Comment: 1-2 drinks per week     Social History     Substance and Sexual Activity   Drug Use No     Social History     Tobacco Use   Smoking Status Never Smoker   Smokeless Tobacco Never Used           ROS:  Review of Systems   Constitutional: Negative for chills, fever and unexpected weight change  HENT: Negative for hearing loss, nosebleeds and sore throat  Eyes: Negative for pain, redness and visual disturbance     Respiratory: Negative for cough, shortness of breath and wheezing  Cardiovascular: Negative for chest pain, palpitations and leg swelling  Gastrointestinal: Negative for abdominal pain, nausea and vomiting  Endocrine: Negative for polydipsia and polyuria  Genitourinary: Negative for dysuria and hematuria  Musculoskeletal: Negative for arthralgias, joint swelling and myalgias  Skin: Negative for rash and wound  Neurological: Negative for dizziness, numbness and headaches  Psychiatric/Behavioral: Negative for decreased concentration and suicidal ideas  The patient is not nervous/anxious  Objective:  BP Readings from Last 1 Encounters:   09/28/20 117/79      Wt Readings from Last 1 Encounters:   09/28/20 85 3 kg (188 lb)        BMI:   Estimated body mass index is 26 98 kg/m² as calculated from the following:    Height as of this encounter: 5' 10" (1 778 m)  Weight as of this encounter: 85 3 kg (188 lb)  EXAM:   Physical Exam  Vitals signs reviewed  Constitutional:       Appearance: He is well-developed  HENT:      Head: Normocephalic and atraumatic  Eyes:      Pupils: Pupils are equal, round, and reactive to light  Neck:      Musculoskeletal: Normal range of motion  Cardiovascular:      Rate and Rhythm: Normal rate and regular rhythm  Pulmonary:      Effort: Pulmonary effort is normal       Breath sounds: Normal breath sounds  Abdominal:      Palpations: Abdomen is soft  Skin:     General: Skin is warm and dry  Neurological:      Mental Status: He is alert and oriented to person, place, and time  Psychiatric:         Behavior: Behavior normal        Right Hand Exam   Right hand exam is normal       Left Hand Exam     Other   Sensation: normal  Pulse: present    Comments: The left wrist exam demonstrates skin intact, no erythema  Significant swelling and ecchymoses are noted at the wrist extending into the hand and digits  Wrist range of motion was deferred secondary to known fracture    Range of motion of the digits is intact and painless  Radiographs:  I have personally reviewed pertinent films in PACS  X-rays and CT of the left wrist performed 09/24/2020 demonstrate acute, comminuted fracture of the left distal radius and fracture of the left ulnar styloid  Repeat x-rays performed today in clinic demonstrates fracture without interval changes in alignment  Fracture / Dislocation Treatment    Date/Time: 9/28/2020 12:23 PM  Performed by: Priscilla Opitz, MD  Authorized by: Priscilla Opitz, MD     Patient Location:  Clinic  Risks and benefits: Risks, benefits and alternatives were discussed    Consent given by:  Patient  Patient states understanding of procedure being performed: Yes    Radiology Images displayed and confirmed  If images not available, report reviewed: Yes    Injury location:  Wrist  Location details:  Left wrist  Injury type:  Fracture  Fracture type: distal radius and ulnar styloid    Fracture type: distal radius and ulnar styloid    Neurovascular status: Neurovascularly intact    Local anesthesia used?: No    Manipulation performed?: No    Immobilization:  Splint  Splint type: dorsal and volar short arm    Supplies used:  Cotton padding, elastic bandage and plaster  Neurovascular status: Neurovascularly intact    Patient tolerance:  Patient tolerated the procedure well with no immediate complications

## 2020-10-05 ENCOUNTER — APPOINTMENT (OUTPATIENT)
Dept: RADIOLOGY | Facility: MEDICAL CENTER | Age: 55
End: 2020-10-05
Payer: COMMERCIAL

## 2020-10-05 VITALS
SYSTOLIC BLOOD PRESSURE: 117 MMHG | TEMPERATURE: 96.9 F | HEIGHT: 70 IN | HEART RATE: 84 BPM | BODY MASS INDEX: 26.92 KG/M2 | WEIGHT: 188 LBS | DIASTOLIC BLOOD PRESSURE: 74 MMHG

## 2020-10-05 DIAGNOSIS — S52.602A CLOSED FRACTURE OF DISTAL ENDS OF LEFT RADIUS AND ULNA, INITIAL ENCOUNTER: Primary | ICD-10-CM

## 2020-10-05 DIAGNOSIS — S62.102A LEFT WRIST FRACTURE: ICD-10-CM

## 2020-10-05 DIAGNOSIS — S52.502A CLOSED FRACTURE OF DISTAL ENDS OF LEFT RADIUS AND ULNA, INITIAL ENCOUNTER: Primary | ICD-10-CM

## 2020-10-05 DIAGNOSIS — S52.502A CLOSED FRACTURE OF DISTAL ENDS OF LEFT RADIUS AND ULNA, INITIAL ENCOUNTER: ICD-10-CM

## 2020-10-05 DIAGNOSIS — S52.602A CLOSED FRACTURE OF DISTAL ENDS OF LEFT RADIUS AND ULNA, INITIAL ENCOUNTER: ICD-10-CM

## 2020-10-05 PROCEDURE — 99024 POSTOP FOLLOW-UP VISIT: CPT | Performed by: ORTHOPAEDIC SURGERY

## 2020-10-05 PROCEDURE — 29075 APPL CST ELBW FNGR SHORT ARM: CPT | Performed by: ORTHOPAEDIC SURGERY

## 2020-10-05 PROCEDURE — 73110 X-RAY EXAM OF WRIST: CPT

## 2020-10-05 RX ORDER — OXYCODONE HYDROCHLORIDE AND ACETAMINOPHEN 5; 325 MG/1; MG/1
1 TABLET ORAL EVERY 8 HOURS PRN
Qty: 5 TABLET | Refills: 0 | Status: SHIPPED | OUTPATIENT
Start: 2020-10-05

## 2020-10-12 ENCOUNTER — APPOINTMENT (OUTPATIENT)
Dept: RADIOLOGY | Facility: MEDICAL CENTER | Age: 55
End: 2020-10-12
Payer: COMMERCIAL

## 2020-10-12 VITALS
TEMPERATURE: 98.3 F | HEIGHT: 70 IN | BODY MASS INDEX: 26.92 KG/M2 | DIASTOLIC BLOOD PRESSURE: 88 MMHG | HEART RATE: 92 BPM | SYSTOLIC BLOOD PRESSURE: 125 MMHG | WEIGHT: 188 LBS

## 2020-10-12 DIAGNOSIS — S52.602D CLOSED FRACTURE OF DISTAL ENDS OF LEFT RADIUS AND ULNA WITH ROUTINE HEALING, SUBSEQUENT ENCOUNTER: Primary | ICD-10-CM

## 2020-10-12 DIAGNOSIS — S52.502D CLOSED FRACTURE OF DISTAL ENDS OF LEFT RADIUS AND ULNA WITH ROUTINE HEALING, SUBSEQUENT ENCOUNTER: ICD-10-CM

## 2020-10-12 DIAGNOSIS — S52.502D CLOSED FRACTURE OF DISTAL ENDS OF LEFT RADIUS AND ULNA WITH ROUTINE HEALING, SUBSEQUENT ENCOUNTER: Primary | ICD-10-CM

## 2020-10-12 DIAGNOSIS — S52.602D CLOSED FRACTURE OF DISTAL ENDS OF LEFT RADIUS AND ULNA WITH ROUTINE HEALING, SUBSEQUENT ENCOUNTER: ICD-10-CM

## 2020-10-12 PROCEDURE — 73110 X-RAY EXAM OF WRIST: CPT

## 2020-10-12 PROCEDURE — 99024 POSTOP FOLLOW-UP VISIT: CPT | Performed by: ORTHOPAEDIC SURGERY

## 2020-11-06 ENCOUNTER — APPOINTMENT (OUTPATIENT)
Dept: RADIOLOGY | Facility: MEDICAL CENTER | Age: 55
End: 2020-11-06
Payer: COMMERCIAL

## 2020-11-06 ENCOUNTER — OFFICE VISIT (OUTPATIENT)
Dept: OBGYN CLINIC | Facility: CLINIC | Age: 55
End: 2020-11-06

## 2020-11-06 VITALS — TEMPERATURE: 97.1 F | HEIGHT: 70 IN | WEIGHT: 188 LBS | BODY MASS INDEX: 26.92 KG/M2

## 2020-11-06 DIAGNOSIS — S52.502D CLOSED FRACTURE OF DISTAL ENDS OF LEFT RADIUS AND ULNA WITH ROUTINE HEALING, SUBSEQUENT ENCOUNTER: Primary | ICD-10-CM

## 2020-11-06 DIAGNOSIS — S52.602D CLOSED FRACTURE OF DISTAL ENDS OF LEFT RADIUS AND ULNA WITH ROUTINE HEALING, SUBSEQUENT ENCOUNTER: ICD-10-CM

## 2020-11-06 DIAGNOSIS — S52.502D CLOSED FRACTURE OF DISTAL ENDS OF LEFT RADIUS AND ULNA WITH ROUTINE HEALING, SUBSEQUENT ENCOUNTER: ICD-10-CM

## 2020-11-06 DIAGNOSIS — S52.602D CLOSED FRACTURE OF DISTAL ENDS OF LEFT RADIUS AND ULNA WITH ROUTINE HEALING, SUBSEQUENT ENCOUNTER: Primary | ICD-10-CM

## 2020-11-06 PROCEDURE — 99024 POSTOP FOLLOW-UP VISIT: CPT | Performed by: ORTHOPAEDIC SURGERY

## 2020-11-06 PROCEDURE — 73110 X-RAY EXAM OF WRIST: CPT

## 2020-12-04 ENCOUNTER — APPOINTMENT (OUTPATIENT)
Dept: RADIOLOGY | Facility: MEDICAL CENTER | Age: 55
End: 2020-12-04
Payer: COMMERCIAL

## 2020-12-04 VITALS
BODY MASS INDEX: 26.92 KG/M2 | SYSTOLIC BLOOD PRESSURE: 104 MMHG | WEIGHT: 188 LBS | HEART RATE: 89 BPM | DIASTOLIC BLOOD PRESSURE: 70 MMHG | HEIGHT: 70 IN

## 2020-12-04 DIAGNOSIS — S52.502D CLOSED FRACTURE OF DISTAL ENDS OF LEFT RADIUS AND ULNA WITH ROUTINE HEALING, SUBSEQUENT ENCOUNTER: Primary | ICD-10-CM

## 2020-12-04 DIAGNOSIS — S52.602D CLOSED FRACTURE OF DISTAL ENDS OF LEFT RADIUS AND ULNA WITH ROUTINE HEALING, SUBSEQUENT ENCOUNTER: Primary | ICD-10-CM

## 2020-12-04 DIAGNOSIS — S52.502D CLOSED FRACTURE OF DISTAL ENDS OF LEFT RADIUS AND ULNA WITH ROUTINE HEALING, SUBSEQUENT ENCOUNTER: ICD-10-CM

## 2020-12-04 DIAGNOSIS — S52.602D CLOSED FRACTURE OF DISTAL ENDS OF LEFT RADIUS AND ULNA WITH ROUTINE HEALING, SUBSEQUENT ENCOUNTER: ICD-10-CM

## 2020-12-04 PROCEDURE — 73110 X-RAY EXAM OF WRIST: CPT

## 2020-12-04 PROCEDURE — 99024 POSTOP FOLLOW-UP VISIT: CPT | Performed by: ORTHOPAEDIC SURGERY

## 2022-07-26 ENCOUNTER — HOSPITAL ENCOUNTER (EMERGENCY)
Facility: HOSPITAL | Age: 57
Discharge: HOME | End: 2022-07-26
Attending: EMERGENCY MEDICINE
Payer: COMMERCIAL

## 2022-07-26 VITALS
OXYGEN SATURATION: 96 % | TEMPERATURE: 97.8 F | HEIGHT: 70 IN | WEIGHT: 185 LBS | SYSTOLIC BLOOD PRESSURE: 129 MMHG | RESPIRATION RATE: 16 BRPM | DIASTOLIC BLOOD PRESSURE: 83 MMHG | HEART RATE: 79 BPM | BODY MASS INDEX: 26.48 KG/M2

## 2022-07-26 DIAGNOSIS — R00.0 TACHYCARDIA: Primary | ICD-10-CM

## 2022-07-26 LAB
ALBUMIN SERPL BCP-MCNC: 4.2 G/DL (ref 3.5–5)
ALP SERPL-CCNC: 56 U/L (ref 34–104)
ALT SERPL W P-5'-P-CCNC: 28 U/L (ref 7–52)
ANION GAP SERPL CALCULATED.3IONS-SCNC: 6 MMOL/L (ref 4–13)
AST SERPL W P-5'-P-CCNC: 40 U/L (ref 13–39)
BASOPHILS # BLD AUTO: 0.1 THOUSANDS/ΜL (ref 0–0.1)
BASOPHILS NFR BLD AUTO: 1 % (ref 0–1)
BILIRUB SERPL-MCNC: 0.27 MG/DL (ref 0.2–1)
BUN SERPL-MCNC: 26 MG/DL (ref 5–25)
CALCIUM SERPL-MCNC: 9.8 MG/DL (ref 8.4–10.2)
CARDIAC TROPONIN I PNL SERPL HS: 4 NG/L
CHLORIDE SERPL-SCNC: 103 MMOL/L (ref 96–108)
CO2 SERPL-SCNC: 28 MMOL/L (ref 21–32)
CREAT SERPL-MCNC: 0.89 MG/DL (ref 0.6–1.3)
EOSINOPHIL # BLD AUTO: 0.22 THOUSAND/ΜL (ref 0–0.61)
EOSINOPHIL NFR BLD AUTO: 2 % (ref 0–6)
ERYTHROCYTE [DISTWIDTH] IN BLOOD BY AUTOMATED COUNT: 12.8 % (ref 11.6–15.1)
GFR SERPL CREATININE-BSD FRML MDRD: 96 ML/MIN/1.73SQ M
GLUCOSE SERPL-MCNC: 109 MG/DL (ref 65–140)
HCT VFR BLD AUTO: 45.6 % (ref 36.5–49.3)
HGB BLD-MCNC: 15.1 G/DL (ref 12–17)
IMM GRANULOCYTES # BLD AUTO: 0.04 THOUSAND/UL (ref 0–0.2)
IMM GRANULOCYTES NFR BLD AUTO: 0 % (ref 0–2)
LYMPHOCYTES # BLD AUTO: 3.86 THOUSANDS/ΜL (ref 0.6–4.47)
LYMPHOCYTES NFR BLD AUTO: 34 % (ref 14–44)
MAGNESIUM SERPL-MCNC: 2 MG/DL (ref 1.9–2.7)
MCH RBC QN AUTO: 32.3 PG (ref 26.8–34.3)
MCHC RBC AUTO-ENTMCNC: 33.1 G/DL (ref 31.4–37.4)
MCV RBC AUTO: 98 FL (ref 82–98)
MONOCYTES # BLD AUTO: 1.12 THOUSAND/ΜL (ref 0.17–1.22)
MONOCYTES NFR BLD AUTO: 10 % (ref 4–12)
NEUTROPHILS # BLD AUTO: 6.17 THOUSANDS/ΜL (ref 1.85–7.62)
NEUTS SEG NFR BLD AUTO: 53 % (ref 43–75)
NRBC BLD AUTO-RTO: 0 /100 WBCS
PHOSPHATE SERPL-MCNC: 3.6 MG/DL (ref 2.7–4.5)
PLATELET # BLD AUTO: 368 THOUSANDS/UL (ref 149–390)
PMV BLD AUTO: 9.8 FL (ref 8.9–12.7)
POTASSIUM SERPL-SCNC: 4.3 MMOL/L (ref 3.5–5.3)
PROT SERPL-MCNC: 7.4 G/DL (ref 6.4–8.4)
RBC # BLD AUTO: 4.67 MILLION/UL (ref 3.88–5.62)
SODIUM SERPL-SCNC: 137 MMOL/L (ref 135–147)
TSH SERPL DL<=0.05 MIU/L-ACNC: 0.77 UIU/ML (ref 0.45–4.5)
WBC # BLD AUTO: 11.51 THOUSAND/UL (ref 4.31–10.16)

## 2022-07-26 PROCEDURE — 85025 COMPLETE CBC W/AUTO DIFF WBC: CPT | Performed by: EMERGENCY MEDICINE

## 2022-07-26 PROCEDURE — 84443 ASSAY THYROID STIM HORMONE: CPT | Performed by: EMERGENCY MEDICINE

## 2022-07-26 PROCEDURE — 99283 EMERGENCY DEPT VISIT LOW MDM: CPT

## 2022-07-26 PROCEDURE — 93005 ELECTROCARDIOGRAM TRACING: CPT

## 2022-07-26 PROCEDURE — 84484 ASSAY OF TROPONIN QUANT: CPT | Performed by: EMERGENCY MEDICINE

## 2022-07-26 PROCEDURE — 80053 COMPREHEN METABOLIC PANEL: CPT | Performed by: EMERGENCY MEDICINE

## 2022-07-26 PROCEDURE — 83735 ASSAY OF MAGNESIUM: CPT | Performed by: EMERGENCY MEDICINE

## 2022-07-26 PROCEDURE — 84100 ASSAY OF PHOSPHORUS: CPT | Performed by: EMERGENCY MEDICINE

## 2022-07-26 PROCEDURE — 36415 COLL VENOUS BLD VENIPUNCTURE: CPT | Performed by: EMERGENCY MEDICINE

## 2022-07-26 PROCEDURE — 99285 EMERGENCY DEPT VISIT HI MDM: CPT | Performed by: EMERGENCY MEDICINE

## 2022-07-26 NOTE — ED PROVIDER NOTES
History  Chief Complaint   Patient presents with    Medical Problem     Patient was on a flight yesterday and his apple watch told him he had a high heart rate for "longer than 10 min"     62-year-old male presents emergency room due to the fact that his Apple watch was telling him that his heart rate was elevated  The patient and notes he was alerted during a flight yesterday  Patient denies any chest pain or shortness of breath at this time  Patient's heart rate has been in the 120s to 140s at times and has 1 reading were was 210  The patient has no complaint and heart rate at this time is 75  Patient denies chest pain shortness of breath  Prior to Admission Medications   Prescriptions Last Dose Informant Patient Reported? Taking? Cholecalciferol (VITAMIN D PO)   Yes No   Sig: Take by mouth daily   MAGNESIUM PO   Yes No   Sig: Take by mouth daily   acyclovir (ZOVIRAX) 400 MG tablet Not Taking at Unknown time  Yes No   Sig: Take 400 mg by mouth 3 (three) times a day   Patient not taking: Reported on 7/26/2022   ondansetron (ZOFRAN) 4 mg tablet   No No   Sig: Take 1 tablet (4 mg total) by mouth every 8 (eight) hours as needed for nausea or vomiting   Patient not taking: Reported on 10/12/2020   oxyCODONE-acetaminophen (PERCOCET) 5-325 mg per tablet Not Taking at Unknown time  No No   Sig: Take 1 tablet by mouth every 8 (eight) hours as needed for moderate pain or severe pain for up to 10 dosesMax Daily Amount: 3 tablets   Patient not taking: Reported on 7/26/2022   tacrolimus (PROGRAF) 1 mg capsule Not Taking at Unknown time Self Yes No   Sig: Take 1 mg by mouth every 12 (twelve) hours   5 bid   Patient not taking: Reported on 7/26/2022      Facility-Administered Medications: None       Past Medical History:   Diagnosis Date    Cancer [facility])     AML- bone marow transplant 2010 at [facility] Clostridium difficile colitis 2009    Diverticulitis     [facility] transplant ([facility] [address] [facility] 2010       Past Surgical History:   Procedure Laterality Date    BONE MARROW TRANSPLANT      COLONOSCOPY  2015    HERNIA REPAIR Bilateral 2003    inguinal    [place] ESOPHAGOGASTRODUODENOSCOPY TRANSORAL DIAGNOSTIC N/A 7/31/2018    Procedure: ESOPHAGOGASTRODUODENOSCOPY (EGD) with bx;  Surgeon: Delfino Robertson MD;  Location: [facility]; Service: Gastroenterology       Family History   Problem Relation Age of Onset    Stroke Father      I have reviewed and agree with the history as documented  E-Cigarette/Vaping     E-Cigarette/Vaping Substances     Social History     Tobacco Use    Smoking status: Never Smoker    Smokeless tobacco: Never Used   Substance Use Topics    Alcohol use: Yes     Comment: 1-2 drinks per week    Drug use: No       Review of Systems   Constitutional: Negative for chills and fever  HENT: Negative for ear pain and sore throat  Eyes: Negative for pain and visual disturbance  Respiratory: Negative for cough and shortness of breath  Cardiovascular: Negative for chest pain and palpitations  Tachycardia   Gastrointestinal: Negative for abdominal pain and vomiting  Genitourinary: Negative for dysuria and hematuria  Musculoskeletal: Negative for arthralgias and back pain  Skin: Negative for color change and rash  Neurological: Negative for seizures and syncope  All other systems reviewed and are negative  Physical Exam  Physical Exam  Constitutional:       General: He is not in acute distress  Appearance: Normal appearance  He is normal weight  He is not ill-appearing  HENT:      Head: Normocephalic and atraumatic  Right Ear: External ear normal       Left Ear: External ear normal       Nose: Nose normal       Mouth/Throat:      Mouth: Mucous membranes are moist    Eyes:      Conjunctiva/sclera: Conjunctivae normal    Cardiovascular:      Rate and Rhythm: Regular rhythm  Tachycardia present  Pulses: Normal pulses  Heart sounds: Normal heart sounds  Pulmonary:      Effort: Pulmonary effort is normal       Breath sounds: Normal breath sounds  Abdominal:      General: Abdomen is flat  There is no distension  Palpations: Abdomen is soft  There is no mass  Musculoskeletal:         General: No swelling, tenderness or deformity  Normal range of motion  Cervical back: Normal range of motion  Skin:     General: Skin is warm and dry  Capillary Refill: Capillary refill takes 2 to 3 seconds  Coloration: Skin is not pale  Neurological:      General: No focal deficit present  Mental Status: He is alert and oriented to person, place, and time  Mental status is at baseline  Psychiatric:         Mood and Affect: Mood normal          Vital Signs  ED Triage Vitals [07/26/22 1306]   Temperature Pulse Respirations Blood Pressure SpO2   97 8 °F (36 6 °C) 79 16 129/83 96 %      Temp Source Heart Rate Source Patient Position - Orthostatic VS BP Location FiO2 (%)   Tympanic Monitor Sitting Right arm --      Pain Score       No Pain           Vitals:    07/26/22 1306   BP: 129/83   Pulse: 79   Patient Position - Orthostatic VS: Sitting         Visual Acuity      ED Medications  Medications - No data to display    Diagnostic Studies  Results Reviewed     Procedure Component Value Units Date/Time    CBC and differential [[id number]]     Lab Status: No result Specimen: Blood     Comprehensive metabolic panel [[id number]]     Lab Status: No result Specimen: Blood     Magnesium [[id number]]     Lab Status: No result Specimen: Blood     Phosphorus [[id number]]     Lab Status: No result Specimen: Blood     TSH [[id number]]     Lab Status: No result Specimen: Blood                  No orders to display              Procedures  ECG 12 Lead Documentation Only    Date/Time: 7/26/2022 1:35 PM  Performed by: Mike De La Cruz DO  Authorized by:  Mike De La Cruz DO     ECG reviewed by me, the ED Provider: yes    Patient location:  ED  Comments:      EKG shows a sinus rhythm at 74 per  There is a left axis deviation with a left anterior fascicular block pattern noted  There is LVH by voltage criteria  There is J-point elevation with concave up ST segment changes seen in 1, aVL, and V2  These do not have the typical appearance of acute injury pattern  ED Course  ED Course as of 07/26/22 1554   Tue Jul 26, 2022   1530 Discussed with patient,                                             MDM    Disposition  Final diagnoses:   None     ED Disposition     None      Follow-up Information    None         Patient's Medications   Discharge Prescriptions    No medications on file       No discharge procedures on file      PDMP Review       Value Time User    PDMP Reviewed  Yes 10/5/2020 10:42 AM Chay Lewis MD          ED Provider  Electronically Signed by           Tara Lundberg DO  07/26/22 1410

## 2022-07-26 NOTE — DISCHARGE INSTRUCTIONS
Return to the ER with any new, concerning, worsening issues  I would recommend that you talk to your family doctor more about following up with a cardiologist for further monitoring

## 2022-07-27 LAB
ATRIAL RATE: 74 BPM
P AXIS: 54 DEGREES
PR INTERVAL: 196 MS
QRS AXIS: -37 DEGREES
QRSD INTERVAL: 88 MS
QT INTERVAL: 378 MS
QTC INTERVAL: 419 MS
T WAVE AXIS: 14 DEGREES
VENTRICULAR RATE: 74 BPM

## 2022-07-27 PROCEDURE — 93010 ELECTROCARDIOGRAM REPORT: CPT | Performed by: INTERNAL MEDICINE

## (undated) DEVICE — SINGLE-USE BIOPSY FORCEPS: Brand: RADIAL JAW 4